# Patient Record
Sex: MALE | Race: WHITE | Employment: OTHER
[De-identification: names, ages, dates, MRNs, and addresses within clinical notes are randomized per-mention and may not be internally consistent; named-entity substitution may affect disease eponyms.]

---

## 2017-09-28 PROBLEM — R31.9 HEMATURIA: Status: ACTIVE | Noted: 2017-09-28

## 2017-11-27 PROBLEM — Z95.2 S/P TAVR (TRANSCATHETER AORTIC VALVE REPLACEMENT): Status: ACTIVE | Noted: 2017-11-27

## 2018-03-06 DIAGNOSIS — Z95.2 S/P TAVR (TRANSCATHETER AORTIC VALVE REPLACEMENT): ICD-10-CM

## 2018-03-06 DIAGNOSIS — I48.91 ATRIAL FIBRILLATION, UNSPECIFIED TYPE (HCC): ICD-10-CM

## 2018-03-06 DIAGNOSIS — Z79.01 CHRONIC ANTICOAGULATION: ICD-10-CM

## 2018-03-06 DIAGNOSIS — Z95.2 S/P MVR (MITRAL VALVE REPLACEMENT): ICD-10-CM

## 2018-03-11 DIAGNOSIS — I34.0 MITRAL VALVE INSUFFICIENCY, UNSPECIFIED ETIOLOGY: ICD-10-CM

## 2018-03-11 DIAGNOSIS — Z79.01 CHRONIC ANTICOAGULATION: ICD-10-CM

## 2018-03-11 DIAGNOSIS — Z95.2 S/P MVR (MITRAL VALVE REPLACEMENT): ICD-10-CM

## 2018-03-11 DIAGNOSIS — Z95.2 S/P TAVR (TRANSCATHETER AORTIC VALVE REPLACEMENT): ICD-10-CM

## 2018-03-11 DIAGNOSIS — I48.91 ATRIAL FIBRILLATION, UNSPECIFIED TYPE (HCC): ICD-10-CM

## 2018-03-12 ENCOUNTER — TELEPHONE (OUTPATIENT)
Dept: CASE MANAGEMENT | Age: 83
End: 2018-03-12

## 2018-03-12 NOTE — LETTER
Program or our CT Lung Cancer Screening Program,  please don't hesitate to call. Sincerely,    Pulmonary Nodule Program  [address]:  [phone]  F:  [phone]                      Medical Imaging Services    3/12/2018      [address] [facility] [address]              Dear Juana Caal,    [address] [place] [address] records indicate that over the past year you had an imaging study/studies done while a patient at a [facility] facility. Because your health is our primary concern, we want to make sure we have the correct primary care physician on file. We currently have Dajuan De La Rosa MD noted as your primary care physician. If this information is not accurate, please call [phone] and provide us with the correct information. If this information is correct, please make sure you have discussed your visit with your physician and understand all results and any follow up recommendations that may or may not be needed now or in the future.            Sincerely,    [address]:  [phone]  F:  [phone]

## 2018-03-12 NOTE — TELEPHONE ENCOUNTER
No call was made, encounter was opened for documentation in the lung nodule navigator flow sheet. Patient has suggested follow up per [facility] for management of incidental pulmonary nodules. Patients with nodules measuring under 0.8cm are not automatically enrolled into incidental pulmonary nodule program.   Notification of nodules letter faxed to Juan Pablo Mills MD 3/12/2018 4:08 PM along with instructions on how to enroll this patient into our incidental pulmonary nodule program if desired. Receipt verified. Letter mailed to patients home stating he should contact his primary care physician to discuss any follow up recommendations.        Angely Sessions, Lung Nodule Navigator

## 2018-03-15 ENCOUNTER — HOSPITAL ENCOUNTER (OUTPATIENT)
Dept: PHARMACY | Age: 83
Setting detail: THERAPIES SERIES
Discharge: HOME | End: 2018-03-15
Payer: MEDICARE

## 2018-03-15 VITALS
BODY MASS INDEX: 30.82 KG/M2 | WEIGHT: 214.8 LBS | DIASTOLIC BLOOD PRESSURE: 81 MMHG | SYSTOLIC BLOOD PRESSURE: 144 MMHG | HEART RATE: 84 BPM

## 2018-03-15 DIAGNOSIS — Z95.2 S/P MVR (MITRAL VALVE REPLACEMENT): ICD-10-CM

## 2018-03-15 DIAGNOSIS — I48.91 ATRIAL FIBRILLATION, UNSPECIFIED TYPE (HCC): ICD-10-CM

## 2018-03-15 LAB — INTERNATIONAL NORMALIZATION RATIO, POC: 3.5

## 2018-03-15 PROCEDURE — 99211 OFF/OP EST MAY X REQ PHY/QHP: CPT

## 2018-03-15 PROCEDURE — 85610 PROTHROMBIN TIME: CPT

## 2018-03-15 RX ORDER — AMLODIPINE BESYLATE 10 MG/1
10 TABLET ORAL DAILY
COMMUNITY

## 2018-03-15 RX ORDER — WARFARIN SODIUM 3 MG/1
3 TABLET ORAL DAILY
COMMUNITY

## 2018-04-12 ENCOUNTER — HOSPITAL ENCOUNTER (OUTPATIENT)
Dept: PHARMACY | Age: 83
Setting detail: THERAPIES SERIES
Discharge: HOME | End: 2018-04-12
Payer: MEDICARE

## 2018-04-12 VITALS
HEART RATE: 73 BPM | BODY MASS INDEX: 31.71 KG/M2 | DIASTOLIC BLOOD PRESSURE: 73 MMHG | WEIGHT: 221 LBS | SYSTOLIC BLOOD PRESSURE: 138 MMHG

## 2018-04-12 DIAGNOSIS — I48.91 ATRIAL FIBRILLATION, UNSPECIFIED TYPE (HCC): ICD-10-CM

## 2018-04-12 DIAGNOSIS — Z95.2 S/P MVR (MITRAL VALVE REPLACEMENT): ICD-10-CM

## 2018-04-12 LAB — INTERNATIONAL NORMALIZATION RATIO, POC: 4.4

## 2018-04-12 PROCEDURE — 99211 OFF/OP EST MAY X REQ PHY/QHP: CPT

## 2018-04-12 PROCEDURE — 85610 PROTHROMBIN TIME: CPT

## 2018-04-20 ENCOUNTER — HOSPITAL ENCOUNTER (OUTPATIENT)
Dept: PHARMACY | Age: 83
Setting detail: THERAPIES SERIES
Discharge: HOME | End: 2018-04-20
Payer: MEDICARE

## 2018-04-20 VITALS
SYSTOLIC BLOOD PRESSURE: 138 MMHG | HEART RATE: 58 BPM | DIASTOLIC BLOOD PRESSURE: 77 MMHG | BODY MASS INDEX: 31.08 KG/M2 | WEIGHT: 216.6 LBS

## 2018-04-20 DIAGNOSIS — I48.91 ATRIAL FIBRILLATION, UNSPECIFIED TYPE (HCC): ICD-10-CM

## 2018-04-20 DIAGNOSIS — Z95.2 S/P MVR (MITRAL VALVE REPLACEMENT): ICD-10-CM

## 2018-04-20 LAB — INTERNATIONAL NORMALIZATION RATIO, POC: 2.9

## 2018-04-20 PROCEDURE — 85610 PROTHROMBIN TIME: CPT

## 2018-04-20 PROCEDURE — 99211 OFF/OP EST MAY X REQ PHY/QHP: CPT

## 2018-05-04 ENCOUNTER — HOSPITAL ENCOUNTER (OUTPATIENT)
Dept: PHARMACY | Age: 83
Setting detail: THERAPIES SERIES
Discharge: HOME | End: 2018-05-04
Payer: MEDICARE

## 2018-05-04 VITALS
WEIGHT: 215 LBS | SYSTOLIC BLOOD PRESSURE: 151 MMHG | DIASTOLIC BLOOD PRESSURE: 82 MMHG | BODY MASS INDEX: 30.85 KG/M2 | HEART RATE: 71 BPM

## 2018-05-04 DIAGNOSIS — Z95.2 S/P MVR (MITRAL VALVE REPLACEMENT): ICD-10-CM

## 2018-05-04 DIAGNOSIS — I48.91 ATRIAL FIBRILLATION, UNSPECIFIED TYPE (HCC): ICD-10-CM

## 2018-05-04 LAB — INTERNATIONAL NORMALIZATION RATIO, POC: 2.6

## 2018-05-04 PROCEDURE — 85610 PROTHROMBIN TIME: CPT

## 2018-05-04 PROCEDURE — 99211 OFF/OP EST MAY X REQ PHY/QHP: CPT

## 2018-05-21 ENCOUNTER — TELEPHONE (OUTPATIENT)
Dept: PHARMACY | Age: 83
End: 2018-05-21

## 2018-05-23 ENCOUNTER — HOSPITAL ENCOUNTER (OUTPATIENT)
Dept: PHARMACY | Age: 83
Setting detail: THERAPIES SERIES
Discharge: HOME | End: 2018-05-23
Payer: MEDICARE

## 2018-05-23 VITALS
DIASTOLIC BLOOD PRESSURE: 66 MMHG | WEIGHT: 212.4 LBS | SYSTOLIC BLOOD PRESSURE: 112 MMHG | BODY MASS INDEX: 30.48 KG/M2 | HEART RATE: 87 BPM

## 2018-05-23 DIAGNOSIS — Z95.2 S/P MVR (MITRAL VALVE REPLACEMENT): ICD-10-CM

## 2018-05-23 DIAGNOSIS — I48.91 ATRIAL FIBRILLATION, UNSPECIFIED TYPE (HCC): ICD-10-CM

## 2018-05-23 LAB — INTERNATIONAL NORMALIZATION RATIO, POC: 3

## 2018-05-23 PROCEDURE — 99211 OFF/OP EST MAY X REQ PHY/QHP: CPT

## 2018-05-23 PROCEDURE — 85610 PROTHROMBIN TIME: CPT

## 2018-05-24 ENCOUNTER — HOSPITAL ENCOUNTER (OUTPATIENT)
Age: 83
Discharge: HOME | End: 2018-05-26

## 2018-05-24 PROCEDURE — 88305 TISSUE EXAM BY PATHOLOGIST: CPT

## 2018-06-05 ENCOUNTER — OFFICE VISIT (OUTPATIENT)
Dept: CARDIOLOGY CLINIC | Age: 83
End: 2018-06-05
Payer: MEDICARE

## 2018-06-05 VITALS
WEIGHT: 215.3 LBS | HEIGHT: 70 IN | SYSTOLIC BLOOD PRESSURE: 134 MMHG | BODY MASS INDEX: 30.82 KG/M2 | HEART RATE: 65 BPM | RESPIRATION RATE: 16 BRPM | DIASTOLIC BLOOD PRESSURE: 60 MMHG

## 2018-06-05 DIAGNOSIS — Z79.01 CHRONIC ANTICOAGULATION: ICD-10-CM

## 2018-06-05 DIAGNOSIS — I48.21 PERMANENT ATRIAL FIBRILLATION (HCC): ICD-10-CM

## 2018-06-05 DIAGNOSIS — Z95.2 S/P TAVR (TRANSCATHETER AORTIC VALVE REPLACEMENT): Primary | ICD-10-CM

## 2018-06-05 DIAGNOSIS — Z95.2 S/P MVR (MITRAL VALVE REPLACEMENT): ICD-10-CM

## 2018-06-05 PROCEDURE — 4040F PNEUMOC VAC/ADMIN/RCVD: CPT | Performed by: INTERNAL MEDICINE

## 2018-06-05 PROCEDURE — 99213 OFFICE O/P EST LOW 20 MIN: CPT | Performed by: INTERNAL MEDICINE

## 2018-06-05 PROCEDURE — 93000 ELECTROCARDIOGRAM COMPLETE: CPT | Performed by: INTERNAL MEDICINE

## 2018-06-05 PROCEDURE — G8417 CALC BMI ABV UP PARAM F/U: HCPCS | Performed by: INTERNAL MEDICINE

## 2018-06-05 PROCEDURE — 1036F TOBACCO NON-USER: CPT | Performed by: INTERNAL MEDICINE

## 2018-06-05 PROCEDURE — 1123F ACP DISCUSS/DSCN MKR DOCD: CPT | Performed by: INTERNAL MEDICINE

## 2018-06-05 PROCEDURE — G8427 DOCREV CUR MEDS BY ELIG CLIN: HCPCS | Performed by: INTERNAL MEDICINE

## 2018-06-05 RX ORDER — WARFARIN SODIUM 4 MG/1
4 TABLET ORAL
COMMUNITY
End: 2019-05-23

## 2018-06-21 ENCOUNTER — HOSPITAL ENCOUNTER (OUTPATIENT)
Dept: PHARMACY | Age: 83
Setting detail: THERAPIES SERIES
Discharge: HOME | End: 2018-06-21
Payer: MEDICARE

## 2018-06-21 VITALS
BODY MASS INDEX: 30.82 KG/M2 | WEIGHT: 214.8 LBS | DIASTOLIC BLOOD PRESSURE: 69 MMHG | SYSTOLIC BLOOD PRESSURE: 119 MMHG | HEART RATE: 75 BPM

## 2018-06-21 DIAGNOSIS — Z95.2 S/P MVR (MITRAL VALVE REPLACEMENT): ICD-10-CM

## 2018-06-21 DIAGNOSIS — I48.21 PERMANENT ATRIAL FIBRILLATION (HCC): ICD-10-CM

## 2018-06-21 LAB — INTERNATIONAL NORMALIZATION RATIO, POC: 3.1

## 2018-06-21 PROCEDURE — 85610 PROTHROMBIN TIME: CPT

## 2018-06-21 PROCEDURE — 99211 OFF/OP EST MAY X REQ PHY/QHP: CPT

## 2018-06-21 RX ORDER — TORSEMIDE 20 MG/1
20 TABLET ORAL DAILY
COMMUNITY

## 2018-07-20 ENCOUNTER — HOSPITAL ENCOUNTER (OUTPATIENT)
Dept: PHARMACY | Age: 83
Setting detail: THERAPIES SERIES
Discharge: HOME | End: 2018-07-20
Payer: MEDICARE

## 2018-07-20 VITALS
HEART RATE: 66 BPM | SYSTOLIC BLOOD PRESSURE: 134 MMHG | BODY MASS INDEX: 31.22 KG/M2 | DIASTOLIC BLOOD PRESSURE: 75 MMHG | WEIGHT: 217.6 LBS

## 2018-07-20 DIAGNOSIS — I48.21 PERMANENT ATRIAL FIBRILLATION (HCC): ICD-10-CM

## 2018-07-20 DIAGNOSIS — Z95.2 S/P MVR (MITRAL VALVE REPLACEMENT): ICD-10-CM

## 2018-07-20 LAB — INTERNATIONAL NORMALIZATION RATIO, POC: 3.3

## 2018-07-20 PROCEDURE — 85610 PROTHROMBIN TIME: CPT

## 2018-07-20 PROCEDURE — 99211 OFF/OP EST MAY X REQ PHY/QHP: CPT

## 2018-07-20 NOTE — PROGRESS NOTES
[facility]    Patient Findings     Negatives:   Signs/symptoms of thrombosis, Signs/symptoms of bleeding, Laboratory test error suspected, Change in health, Change in alcohol use, Change in activity, Upcoming invasive procedure, Emergency department visit, Upcoming dental procedure, Missed doses, Extra doses, Change in medications, Change in diet/appetite, Hospital admission, Bruising, Other complaints    Comments:   DR. Yair Moran CNP NEXT Philip Hanson         Patient  reports that he has quit smoking. He has never used smokeless tobacco.     Assessment/Plan:  INR is therapeutic at 3.3, goal is 2.5-3.5. Warfarin Dose: same (4 mg Mon, Wed, Fri; 3 mg all other days)  Follow Up: 4 weeks    Patient understands dosing directions and information discussed. Dosing schedule and follow up appointment given to patient. Patient acknowledges working in consult agreement with pharmacist as referred by his/her physician.     Tonya Starr PharmD 7/20/2018 9:13 AM

## 2018-08-17 ENCOUNTER — HOSPITAL ENCOUNTER (OUTPATIENT)
Dept: PHARMACY | Age: 83
Setting detail: THERAPIES SERIES
Discharge: HOME | End: 2018-08-17
Payer: MEDICARE

## 2018-08-17 VITALS
BODY MASS INDEX: 31.31 KG/M2 | HEART RATE: 70 BPM | DIASTOLIC BLOOD PRESSURE: 75 MMHG | SYSTOLIC BLOOD PRESSURE: 131 MMHG | WEIGHT: 218.2 LBS

## 2018-08-17 DIAGNOSIS — I48.21 PERMANENT ATRIAL FIBRILLATION (HCC): ICD-10-CM

## 2018-08-17 DIAGNOSIS — Z95.2 S/P MVR (MITRAL VALVE REPLACEMENT): ICD-10-CM

## 2018-08-17 LAB — INTERNATIONAL NORMALIZATION RATIO, POC: 5

## 2018-08-17 PROCEDURE — 85610 PROTHROMBIN TIME: CPT

## 2018-08-17 PROCEDURE — 99211 OFF/OP EST MAY X REQ PHY/QHP: CPT

## 2018-08-17 RX ORDER — OLMESARTAN MEDOXOMIL 40 MG/1
40 TABLET ORAL DAILY
Status: ON HOLD | COMMUNITY
End: 2019-01-01 | Stop reason: HOSPADM

## 2018-08-27 ENCOUNTER — HOSPITAL ENCOUNTER (OUTPATIENT)
Dept: PHARMACY | Age: 83
Setting detail: THERAPIES SERIES
Discharge: HOME | End: 2018-08-27
Payer: MEDICARE

## 2018-08-27 VITALS
WEIGHT: 219 LBS | SYSTOLIC BLOOD PRESSURE: 135 MMHG | BODY MASS INDEX: 31.42 KG/M2 | DIASTOLIC BLOOD PRESSURE: 76 MMHG | HEART RATE: 78 BPM

## 2018-08-27 DIAGNOSIS — Z95.2 S/P MVR (MITRAL VALVE REPLACEMENT): ICD-10-CM

## 2018-08-27 DIAGNOSIS — I48.21 PERMANENT ATRIAL FIBRILLATION (HCC): ICD-10-CM

## 2018-08-27 LAB — INTERNATIONAL NORMALIZATION RATIO, POC: 2.7

## 2018-08-27 PROCEDURE — 85610 PROTHROMBIN TIME: CPT

## 2018-08-27 PROCEDURE — 99211 OFF/OP EST MAY X REQ PHY/QHP: CPT

## 2018-08-27 NOTE — PROGRESS NOTES
[address] [facility]    Patient Findings     Positives:   Change in medications (HE WILL BE ON PREDNISONE FOR AT LEAST 1.5 MORE WEEKS; TO DETERMINE IF IT IS TO BE CONTINUED AFTER LABWORK), Bruising    Negatives:   Signs/symptoms of thrombosis, Signs/symptoms of bleeding, Laboratory test error suspected, Change in health, Change in alcohol use, Change in activity, Upcoming invasive procedure, Emergency department visit, Upcoming dental procedure, Missed doses, Extra doses, Change in diet/appetite, Hospital admission, Other complaints    Comments:   NO APPOINTMENTS Jareth. Patient  reports that he has quit smoking. He has never used smokeless tobacco.     Assessment/Plan:  INR is therapeutic at 2.7, goal is 2.5-3.5. Warfarin Dose: same (4 mg on Wed.; 3 mg all other days) PATIENT TO CALL IF PREDNISONE STOPPED. IF SO, WILL INCREASE WARFARIN DOSE BACK TO PREVIOUS SCHEDULE (4 MG M/W/F; 3 MG ALL OTHER DAYS)  Follow Up: 2 weeks    Patient understands dosing directions and information discussed. Dosing schedule and follow up appointment given to patient. Patient acknowledges working in consult agreement with pharmacist as referred by his/her physician.     Ashutosh Castrejon PharmD 8/27/2018 8:48 AM

## 2018-09-10 ENCOUNTER — HOSPITAL ENCOUNTER (OUTPATIENT)
Dept: PHARMACY | Age: 83
Setting detail: THERAPIES SERIES
Discharge: HOME | End: 2018-09-10
Payer: MEDICARE

## 2018-09-10 VITALS
WEIGHT: 217.6 LBS | DIASTOLIC BLOOD PRESSURE: 80 MMHG | BODY MASS INDEX: 31.22 KG/M2 | SYSTOLIC BLOOD PRESSURE: 147 MMHG | HEART RATE: 82 BPM

## 2018-09-10 DIAGNOSIS — I48.21 PERMANENT ATRIAL FIBRILLATION (HCC): ICD-10-CM

## 2018-09-10 DIAGNOSIS — Z95.2 S/P MVR (MITRAL VALVE REPLACEMENT): ICD-10-CM

## 2018-09-10 DIAGNOSIS — Z95.2 HISTORY OF MITRAL VALVE REPLACEMENT WITH MECHANICAL VALVE: ICD-10-CM

## 2018-09-10 LAB — INTERNATIONAL NORMALIZATION RATIO, POC: 4.4

## 2018-09-10 PROCEDURE — 85610 PROTHROMBIN TIME: CPT

## 2018-09-10 PROCEDURE — 99211 OFF/OP EST MAY X REQ PHY/QHP: CPT

## 2018-09-10 NOTE — PROGRESS NOTES
[address] [facility]    Patient Findings     Positives:   Change in medications (PREDNISONE DECREASED FROM 10 MG TO 8 MG DAILY ON 9/6 (FOR AT LEAST ONE MONTH))    Negatives:   Signs/symptoms of thrombosis, Signs/symptoms of bleeding, Laboratory test error suspected, Change in health, Change in alcohol use, Change in activity, Upcoming invasive procedure, Emergency department visit, Upcoming dental procedure, Missed doses, Extra doses, Change in diet/appetite, Hospital admission, Bruising, Other complaints         Patient  reports that he has quit smoking. He has never used smokeless tobacco.     Assessment/Plan:  INR is SUPRAtherapeutic at 4.4, goal is 2.5-3.5. CAUSE UNKNOWN; WARFARIN DOSE WAS DECREASED 2 VISITS AGO AFTER PREDNISONE STARTED; UNSURE WHY INR ELEVATED TODAY    Warfarin Dose: HOLD TODAY THEN DECREASE WEEKLY DOSE BY 9%     Follow Up: 2 weeks    Patient understands dosing directions and information discussed. Dosing schedule and follow up appointment given to patient. Patient acknowledges working in consult agreement with pharmacist as referred by his/her physician.     Lucien De Jesus PharmD 9/10/2018 12:13 PM

## 2018-09-26 ENCOUNTER — HOSPITAL ENCOUNTER (OUTPATIENT)
Dept: PHARMACY | Age: 83
Setting detail: THERAPIES SERIES
Discharge: HOME | End: 2018-09-26
Payer: MEDICARE

## 2018-09-26 VITALS
SYSTOLIC BLOOD PRESSURE: 126 MMHG | HEART RATE: 80 BPM | BODY MASS INDEX: 31.37 KG/M2 | WEIGHT: 218.6 LBS | DIASTOLIC BLOOD PRESSURE: 70 MMHG

## 2018-09-26 DIAGNOSIS — Z95.2 HISTORY OF MITRAL VALVE REPLACEMENT WITH MECHANICAL VALVE: ICD-10-CM

## 2018-09-26 DIAGNOSIS — I48.91 ATRIAL FIBRILLATION, UNSPECIFIED TYPE (HCC): ICD-10-CM

## 2018-09-26 LAB — INTERNATIONAL NORMALIZATION RATIO, POC: 2.7

## 2018-09-26 PROCEDURE — 99211 OFF/OP EST MAY X REQ PHY/QHP: CPT

## 2018-09-26 PROCEDURE — 85610 PROTHROMBIN TIME: CPT

## 2018-09-26 NOTE — PROGRESS NOTES
[address] [facility]    Patient Findings     Negatives:   Signs/symptoms of thrombosis, Signs/symptoms of bleeding, Laboratory test error suspected, Change in health, Change in alcohol use, Change in activity, Upcoming invasive procedure, Emergency department visit, Upcoming dental procedure, Missed doses, Extra doses, Change in medications, Change in diet/appetite, Hospital admission, Bruising, Other complaints         Patient  reports that he has quit smoking. He has never used smokeless tobacco.     Assessment/Plan:  INR is therapeutic at 2.7, goal is 2.5-3.5. Warfarin Dose: same (2 mg on Wed.; 3 mg all other days)    Follow Up: 2 weeks    Patient understands dosing directions and information discussed. Dosing schedule and follow up appointment given to patient. Patient acknowledges working in consult agreement with pharmacist as referred by his/her physician.     Chema Miranda PharmD 9/26/2018 8:50 AM

## 2018-09-27 ENCOUNTER — HOSPITAL ENCOUNTER (OUTPATIENT)
Dept: NON INVASIVE DIAGNOSTICS | Age: 83
Discharge: HOME | End: 2018-09-27
Payer: MEDICARE

## 2018-09-27 VITALS
DIASTOLIC BLOOD PRESSURE: 69 MMHG | HEIGHT: 70 IN | HEART RATE: 65 BPM | WEIGHT: 218 LBS | BODY MASS INDEX: 31.21 KG/M2 | SYSTOLIC BLOOD PRESSURE: 129 MMHG | TEMPERATURE: 97.9 F

## 2018-09-27 LAB
LV EF: 60 %
LVEF MODALITY: NORMAL

## 2018-09-27 PROCEDURE — 99211 OFF/OP EST MAY X REQ PHY/QHP: CPT

## 2018-09-27 PROCEDURE — 99213 OFFICE O/P EST LOW 20 MIN: CPT | Performed by: PHYSICIAN ASSISTANT

## 2018-09-27 PROCEDURE — 93306 TTE W/DOPPLER COMPLETE: CPT

## 2018-10-11 ENCOUNTER — HOSPITAL ENCOUNTER (OUTPATIENT)
Dept: PHARMACY | Age: 83
Setting detail: THERAPIES SERIES
Discharge: HOME | End: 2018-10-11
Payer: MEDICARE

## 2018-10-11 VITALS — DIASTOLIC BLOOD PRESSURE: 71 MMHG | HEART RATE: 82 BPM | SYSTOLIC BLOOD PRESSURE: 129 MMHG

## 2018-10-11 DIAGNOSIS — Z95.2 HISTORY OF MITRAL VALVE REPLACEMENT WITH MECHANICAL VALVE: ICD-10-CM

## 2018-10-11 DIAGNOSIS — I48.91 ATRIAL FIBRILLATION, UNSPECIFIED TYPE (HCC): ICD-10-CM

## 2018-10-11 LAB — INTERNATIONAL NORMALIZATION RATIO, POC: 2.2

## 2018-10-11 PROCEDURE — 85610 PROTHROMBIN TIME: CPT

## 2018-10-11 PROCEDURE — 99211 OFF/OP EST MAY X REQ PHY/QHP: CPT

## 2018-10-26 ENCOUNTER — HOSPITAL ENCOUNTER (OUTPATIENT)
Dept: PHARMACY | Age: 83
Setting detail: THERAPIES SERIES
Discharge: HOME | End: 2018-10-26
Payer: MEDICARE

## 2018-10-26 VITALS
HEART RATE: 87 BPM | DIASTOLIC BLOOD PRESSURE: 75 MMHG | SYSTOLIC BLOOD PRESSURE: 126 MMHG | WEIGHT: 222.6 LBS | BODY MASS INDEX: 31.94 KG/M2

## 2018-10-26 DIAGNOSIS — I48.91 ATRIAL FIBRILLATION, UNSPECIFIED TYPE (HCC): ICD-10-CM

## 2018-10-26 DIAGNOSIS — Z95.2 HISTORY OF MITRAL VALVE REPLACEMENT WITH MECHANICAL VALVE: ICD-10-CM

## 2018-10-26 LAB — INTERNATIONAL NORMALIZATION RATIO, POC: 3.1

## 2018-10-26 PROCEDURE — 85610 PROTHROMBIN TIME: CPT

## 2018-10-26 PROCEDURE — 99211 OFF/OP EST MAY X REQ PHY/QHP: CPT

## 2018-10-26 NOTE — PROGRESS NOTES
[address] [facility]    Patient Findings     Positives:   Change in medications (PREDNISONE AT 7 MG, TO DECREASE TO 6 MG DAILY NEXT WEEK)    Negatives:   Signs/symptoms of thrombosis, Signs/symptoms of bleeding, Laboratory test error suspected, Change in health, Change in alcohol use, Change in activity, Upcoming invasive procedure, Emergency department visit, Upcoming dental procedure, Missed doses, Extra doses, Change in diet/appetite, Hospital admission, Bruising, Other complaints    Comments:   Dr. Mary Ellen Caldera CNP in Nov.         Patient  reports that he has quit smoking. He has never used smokeless tobacco.     Assessment/Plan:  Warfarin indication: mechanical MVR; atrial fibrillation      INR today is therapeutic at 3.1, goal is 2.5-3.5. Warfarin Dose: same (4 mg on Thur.; 3 mg all other days)    Follow Up: 2.5-3 weeks    Patient understands dosing directions and information discussed. Dosing schedule and follow up appointment given to patient. Patient acknowledges working in consult agreement with pharmacist as referred by his/her physician.     Arun Obregon PharmD 10/26/2018 8:48 AM

## 2018-11-16 ENCOUNTER — HOSPITAL ENCOUNTER (OUTPATIENT)
Dept: PHARMACY | Age: 83
Setting detail: THERAPIES SERIES
Discharge: HOME | End: 2018-11-16
Payer: MEDICARE

## 2018-11-16 VITALS
HEART RATE: 85 BPM | WEIGHT: 219 LBS | DIASTOLIC BLOOD PRESSURE: 88 MMHG | SYSTOLIC BLOOD PRESSURE: 158 MMHG | BODY MASS INDEX: 31.42 KG/M2

## 2018-11-16 DIAGNOSIS — Z95.2 HISTORY OF MITRAL VALVE REPLACEMENT WITH MECHANICAL VALVE: ICD-10-CM

## 2018-11-16 DIAGNOSIS — I48.91 ATRIAL FIBRILLATION, UNSPECIFIED TYPE (HCC): ICD-10-CM

## 2018-11-16 LAB — INTERNATIONAL NORMALIZATION RATIO, POC: 3.3

## 2018-11-16 PROCEDURE — 85610 PROTHROMBIN TIME: CPT

## 2018-11-16 PROCEDURE — 99211 OFF/OP EST MAY X REQ PHY/QHP: CPT

## 2018-11-30 NOTE — PROGRESS NOTES
[facility]  Visit Note      Patient name: Mercedes Childs    Reason for visit: TAVR follow up    Referring Physician: Jordan Casillas MD    Primary Care Physician: Ami Blanco MD    Date of service: 9/27/2018      Chief Complaint: TAVR follow up     HPI: Mr. Doug Otero presents for follow up s/p TAVR on 9/27/17. He continues to do very well. He is able to perform all desired activities without dyspnea. He denies chest pain, orthopnea, PND, palpitations or syncope. He continues with mild LE edema, but is wearing MICHELLE hose. His arthritis pain has improved as well. Allergies: No Known Allergies    Home medications:    Current Outpatient Prescriptions   Medication Sig Dispense Refill    predniSONE (DELTASONE) 5 MG tablet Take 7 mg by mouth See Admin Instructions TAKING TWO 1MG TABS + 5 MG TO MAKE 8 MG (ONCE DAILY)      olmesartan (BENICAR) 40 MG tablet Take 40 mg by mouth daily      magnesium hydroxide (MILK OF MAGNESIA) 400 MG/5ML suspension Take by mouth daily as needed for Constipation      torsemide (DEMADEX) 20 MG tablet Take 20 mg by mouth daily      warfarin (COUMADIN) 4 MG tablet Take 4 mg by mouth three times a week mon, wed and fri, 4 mg      warfarin (COUMADIN) 3 MG tablet Take 3 mg by mouth daily Take as directed by [place] [address] [facility] (Medication Management)   MENDEZ Coumadin      amLODIPine (NORVASC) 10 MG tablet Take 10 mg by mouth daily      HYDROcodone-acetaminophen (NORCO)  MG per tablet TK 1 T PO QID PRN  0    metoprolol (TOPROL-XL) 50 MG XL tablet Take 25 mg by mouth 2 times daily Take morning of surgery with a sip of water      Multiple Vitamins-Minerals (CERTAGEN SILVER PO) Take  by mouth daily. No current facility-administered medications for this encounter.         Past Medical History:  Past Medical History:   Diagnosis Date    Arrhythmia atrial fibrillation    [facility]     skin nose    Difficult intubation 04/11/2007    tube size 7.5    normal effort. He is not in respiratory distress. Breath sounds clear to auscultation. No wheezes. Heart: Normal rate. Regular rhythm. S1 normal and S2 normal. I/VI systolic murmur. Chest: Symmetric chest wall expansion. Extremities: Normal range of motion. Trace-mild edema bilaterally. Neurological: Patient is alert and oriented to person, place and time. Patient has normal reflexes. Skin: Warm and dry. Abdomen: Abdomen is soft and non-distended. Bowel sounds are normal. There is no abdominal tenderness tenderness. There is no guarding. There is no mass. Pulses: Distal pulses are intact. Skin: Warm and dry without lesions. Assessment:   1. Severe AS s/p TAVR - doing well. Mean gradient 10 mmHg by echo today  2. Mechanical MVR  3. Permanent AFib - chronic warfarin managed at [facility]  4. NYHA class I      Plan:   1. Follow up for one year echo as scheduled  2. SBE prophylaxis  3. Follow up with Esperanza Gill and Reinaldo Shen as scheduled      Electronically signed by Mis Jeffries PA-C on 11/30/2018 at 10:58 AM

## 2018-12-06 PROBLEM — I48.21 PERMANENT ATRIAL FIBRILLATION (HCC): Status: ACTIVE | Noted: 2018-12-06

## 2018-12-07 ENCOUNTER — OFFICE VISIT (OUTPATIENT)
Dept: CARDIOLOGY CLINIC | Age: 83
End: 2018-12-07
Payer: MEDICARE

## 2018-12-07 VITALS
SYSTOLIC BLOOD PRESSURE: 120 MMHG | HEIGHT: 70 IN | BODY MASS INDEX: 30.71 KG/M2 | RESPIRATION RATE: 18 BRPM | WEIGHT: 214.5 LBS | DIASTOLIC BLOOD PRESSURE: 74 MMHG | HEART RATE: 69 BPM

## 2018-12-07 DIAGNOSIS — Z95.2 S/P TAVR (TRANSCATHETER AORTIC VALVE REPLACEMENT): ICD-10-CM

## 2018-12-07 DIAGNOSIS — Z79.01 CHRONIC ANTICOAGULATION: ICD-10-CM

## 2018-12-07 DIAGNOSIS — Z95.2 S/P MVR (MITRAL VALVE REPLACEMENT): Primary | ICD-10-CM

## 2018-12-07 DIAGNOSIS — I48.21 PERMANENT ATRIAL FIBRILLATION (HCC): ICD-10-CM

## 2018-12-07 PROCEDURE — 1101F PT FALLS ASSESS-DOCD LE1/YR: CPT | Performed by: INTERNAL MEDICINE

## 2018-12-07 PROCEDURE — G8417 CALC BMI ABV UP PARAM F/U: HCPCS | Performed by: INTERNAL MEDICINE

## 2018-12-07 PROCEDURE — G8484 FLU IMMUNIZE NO ADMIN: HCPCS | Performed by: INTERNAL MEDICINE

## 2018-12-07 PROCEDURE — 99214 OFFICE O/P EST MOD 30 MIN: CPT | Performed by: INTERNAL MEDICINE

## 2018-12-07 PROCEDURE — 1036F TOBACCO NON-USER: CPT | Performed by: INTERNAL MEDICINE

## 2018-12-07 PROCEDURE — 93000 ELECTROCARDIOGRAM COMPLETE: CPT | Performed by: INTERNAL MEDICINE

## 2018-12-07 PROCEDURE — 1123F ACP DISCUSS/DSCN MKR DOCD: CPT | Performed by: INTERNAL MEDICINE

## 2018-12-07 PROCEDURE — G8427 DOCREV CUR MEDS BY ELIG CLIN: HCPCS | Performed by: INTERNAL MEDICINE

## 2018-12-07 PROCEDURE — 4040F PNEUMOC VAC/ADMIN/RCVD: CPT | Performed by: INTERNAL MEDICINE

## 2018-12-07 RX ORDER — LOPERAMIDE HYDROCHLORIDE 2 MG/1
2 CAPSULE ORAL 4 TIMES DAILY PRN
COMMUNITY
End: 2019-06-17 | Stop reason: CLARIF

## 2018-12-07 RX ORDER — MAGNESIUM HYDROXIDE/ALUMINUM HYDROXICE/SIMETHICONE 120; 1200; 1200 MG/30ML; MG/30ML; MG/30ML
5 SUSPENSION ORAL EVERY 6 HOURS PRN
Status: ON HOLD | COMMUNITY
End: 2019-01-01 | Stop reason: HOSPADM

## 2018-12-07 RX ORDER — ACETAMINOPHEN 325 MG/1
650 TABLET ORAL EVERY 6 HOURS PRN
COMMUNITY
End: 2019-06-17 | Stop reason: CLARIF

## 2018-12-07 RX ORDER — GUAIFENESIN AND DEXTROMETHORPHAN HYDROBROMIDE 100; 10 MG/5ML; MG/5ML
5 SOLUTION ORAL EVERY 4 HOURS PRN
Status: ON HOLD | COMMUNITY
End: 2019-01-01 | Stop reason: HOSPADM

## 2018-12-07 RX ORDER — BISACODYL 10 MG
10 SUPPOSITORY, RECTAL RECTAL DAILY
Status: ON HOLD | COMMUNITY
End: 2019-01-01 | Stop reason: HOSPADM

## 2018-12-07 RX ORDER — LORATADINE 10 MG/1
10 CAPSULE, LIQUID FILLED ORAL DAILY
COMMUNITY
End: 2019-06-17 | Stop reason: CLARIF

## 2018-12-14 ENCOUNTER — HOSPITAL ENCOUNTER (OUTPATIENT)
Dept: PHARMACY | Age: 83
Setting detail: THERAPIES SERIES
Discharge: HOME | End: 2018-12-14
Payer: MEDICARE

## 2018-12-14 VITALS
SYSTOLIC BLOOD PRESSURE: 126 MMHG | WEIGHT: 216 LBS | HEART RATE: 79 BPM | DIASTOLIC BLOOD PRESSURE: 75 MMHG | BODY MASS INDEX: 30.99 KG/M2

## 2018-12-14 DIAGNOSIS — Z95.2 HISTORY OF MITRAL VALVE REPLACEMENT WITH MECHANICAL VALVE: ICD-10-CM

## 2018-12-14 LAB — INTERNATIONAL NORMALIZATION RATIO, POC: 2.9

## 2018-12-14 PROCEDURE — 85610 PROTHROMBIN TIME: CPT

## 2018-12-14 PROCEDURE — 99211 OFF/OP EST MAY X REQ PHY/QHP: CPT

## 2019-01-01 ENCOUNTER — OFFICE VISIT (OUTPATIENT)
Dept: PALLATIVE CARE | Age: 84
End: 2019-01-01
Payer: MEDICARE

## 2019-01-01 ENCOUNTER — TELEPHONE (OUTPATIENT)
Dept: PHARMACY | Age: 84
End: 2019-01-01

## 2019-01-01 ENCOUNTER — APPOINTMENT (OUTPATIENT)
Dept: GENERAL RADIOLOGY | Age: 84
End: 2019-01-01
Payer: MEDICARE

## 2019-01-01 ENCOUNTER — APPOINTMENT (OUTPATIENT)
Dept: GENERAL RADIOLOGY | Age: 84
DRG: 178 | End: 2019-01-01
Payer: MEDICARE

## 2019-01-01 ENCOUNTER — HOSPITAL ENCOUNTER (OUTPATIENT)
Dept: PHARMACY | Age: 84
Setting detail: THERAPIES SERIES
Discharge: HOME | End: 2019-08-01
Payer: MEDICARE

## 2019-01-01 ENCOUNTER — HOSPITAL ENCOUNTER (OUTPATIENT)
Dept: PHARMACY | Age: 84
Setting detail: THERAPIES SERIES
Discharge: HOME | End: 2019-08-30
Payer: MEDICARE

## 2019-01-01 ENCOUNTER — CLINICAL DOCUMENTATION (OUTPATIENT)
Dept: PALLATIVE CARE | Age: 84
End: 2019-01-01

## 2019-01-01 ENCOUNTER — CARE COORDINATION (OUTPATIENT)
Dept: CASE MANAGEMENT | Age: 84
End: 2019-01-01

## 2019-01-01 ENCOUNTER — OFFICE VISIT (OUTPATIENT)
Dept: CARDIOLOGY CLINIC | Age: 84
End: 2019-01-01
Payer: MEDICARE

## 2019-01-01 ENCOUNTER — TELEPHONE (OUTPATIENT)
Dept: PALLATIVE CARE | Age: 84
End: 2019-01-01

## 2019-01-01 ENCOUNTER — APPOINTMENT (OUTPATIENT)
Dept: CT IMAGING | Age: 84
DRG: 178 | End: 2019-01-01
Payer: MEDICARE

## 2019-01-01 ENCOUNTER — HOSPITAL ENCOUNTER (INPATIENT)
Age: 84
LOS: 8 days | Discharge: HOSPICE | DRG: 178 | End: 2019-08-27
Attending: EMERGENCY MEDICINE | Admitting: FAMILY MEDICINE
Payer: MEDICARE

## 2019-01-01 ENCOUNTER — HOSPITAL ENCOUNTER (EMERGENCY)
Age: 84
Discharge: HOME | End: 2019-08-16
Attending: EMERGENCY MEDICINE
Payer: MEDICARE

## 2019-01-01 ENCOUNTER — HOSPITAL ENCOUNTER (OUTPATIENT)
Dept: PHARMACY | Age: 84
Setting detail: THERAPIES SERIES
Discharge: HOME | End: 2019-07-19
Payer: MEDICARE

## 2019-01-01 ENCOUNTER — HOSPITAL ENCOUNTER (EMERGENCY)
Age: 84
Discharge: HOME | End: 2019-08-17
Attending: EMERGENCY MEDICINE
Payer: MEDICARE

## 2019-01-01 ENCOUNTER — HOSPITAL ENCOUNTER (EMERGENCY)
Age: 84
Discharge: HOME | End: 2019-08-18
Attending: EMERGENCY MEDICINE
Payer: MEDICARE

## 2019-01-01 VITALS
SYSTOLIC BLOOD PRESSURE: 112 MMHG | HEART RATE: 78 BPM | RESPIRATION RATE: 18 BRPM | WEIGHT: 203 LBS | DIASTOLIC BLOOD PRESSURE: 70 MMHG | BODY MASS INDEX: 29.06 KG/M2 | HEIGHT: 70 IN

## 2019-01-01 VITALS
OXYGEN SATURATION: 99 % | DIASTOLIC BLOOD PRESSURE: 66 MMHG | WEIGHT: 215 LBS | RESPIRATION RATE: 18 BRPM | BODY MASS INDEX: 30.78 KG/M2 | TEMPERATURE: 97.9 F | HEIGHT: 70 IN | HEART RATE: 89 BPM | SYSTOLIC BLOOD PRESSURE: 111 MMHG

## 2019-01-01 VITALS
DIASTOLIC BLOOD PRESSURE: 71 MMHG | SYSTOLIC BLOOD PRESSURE: 153 MMHG | HEART RATE: 74 BPM | WEIGHT: 215.8 LBS | BODY MASS INDEX: 30.96 KG/M2

## 2019-01-01 VITALS
TEMPERATURE: 98 F | HEART RATE: 69 BPM | HEIGHT: 70 IN | WEIGHT: 215 LBS | SYSTOLIC BLOOD PRESSURE: 140 MMHG | RESPIRATION RATE: 16 BRPM | DIASTOLIC BLOOD PRESSURE: 75 MMHG | BODY MASS INDEX: 30.78 KG/M2 | OXYGEN SATURATION: 96 %

## 2019-01-01 VITALS
HEART RATE: 73 BPM | WEIGHT: 210 LBS | DIASTOLIC BLOOD PRESSURE: 58 MMHG | SYSTOLIC BLOOD PRESSURE: 99 MMHG | BODY MASS INDEX: 30.13 KG/M2

## 2019-01-01 VITALS
RESPIRATION RATE: 14 BRPM | HEART RATE: 66 BPM | OXYGEN SATURATION: 95 % | DIASTOLIC BLOOD PRESSURE: 68 MMHG | SYSTOLIC BLOOD PRESSURE: 132 MMHG

## 2019-01-01 VITALS
OXYGEN SATURATION: 98 % | DIASTOLIC BLOOD PRESSURE: 68 MMHG | HEIGHT: 70 IN | HEART RATE: 79 BPM | WEIGHT: 213.2 LBS | TEMPERATURE: 98.3 F | BODY MASS INDEX: 30.52 KG/M2 | RESPIRATION RATE: 18 BRPM | SYSTOLIC BLOOD PRESSURE: 146 MMHG

## 2019-01-01 VITALS
RESPIRATION RATE: 14 BRPM | OXYGEN SATURATION: 97 % | HEART RATE: 78 BPM | SYSTOLIC BLOOD PRESSURE: 142 MMHG | DIASTOLIC BLOOD PRESSURE: 68 MMHG

## 2019-01-01 VITALS
WEIGHT: 215 LBS | OXYGEN SATURATION: 98 % | TEMPERATURE: 97.8 F | BODY MASS INDEX: 30.78 KG/M2 | RESPIRATION RATE: 18 BRPM | HEIGHT: 70 IN | DIASTOLIC BLOOD PRESSURE: 81 MMHG | SYSTOLIC BLOOD PRESSURE: 147 MMHG | HEART RATE: 63 BPM

## 2019-01-01 VITALS
DIASTOLIC BLOOD PRESSURE: 77 MMHG | WEIGHT: 216.8 LBS | SYSTOLIC BLOOD PRESSURE: 143 MMHG | HEART RATE: 71 BPM | BODY MASS INDEX: 31.11 KG/M2

## 2019-01-01 DIAGNOSIS — Z79.01 CHRONIC ANTICOAGULATION: ICD-10-CM

## 2019-01-01 DIAGNOSIS — I48.91 ATRIAL FIBRILLATION, UNSPECIFIED TYPE (HCC): ICD-10-CM

## 2019-01-01 DIAGNOSIS — I48.21 PERMANENT ATRIAL FIBRILLATION (HCC): ICD-10-CM

## 2019-01-01 DIAGNOSIS — Z95.2 S/P MVR (MITRAL VALVE REPLACEMENT): Primary | ICD-10-CM

## 2019-01-01 DIAGNOSIS — Z95.2 HISTORY OF MITRAL VALVE REPLACEMENT WITH MECHANICAL VALVE: ICD-10-CM

## 2019-01-01 DIAGNOSIS — Z51.5 PALLIATIVE CARE BY SPECIALIST: Primary | ICD-10-CM

## 2019-01-01 DIAGNOSIS — Z95.2 S/P TAVR (TRANSCATHETER AORTIC VALVE REPLACEMENT): ICD-10-CM

## 2019-01-01 DIAGNOSIS — K06.8 BLEEDING GUMS: Primary | ICD-10-CM

## 2019-01-01 DIAGNOSIS — Z79.01 ANTICOAGULATED ON COUMADIN: ICD-10-CM

## 2019-01-01 DIAGNOSIS — R54 AGE-RELATED PHYSICAL DEBILITY: ICD-10-CM

## 2019-01-01 LAB
ABO/RH: NORMAL
ALBUMIN SERPL-MCNC: 3.6 G/DL (ref 3.5–5.2)
ALBUMIN SERPL-MCNC: 3.7 G/DL (ref 3.5–5.2)
ALBUMIN SERPL-MCNC: 3.8 G/DL (ref 3.5–5.2)
ALP BLD-CCNC: 54 U/L (ref 40–129)
ALP BLD-CCNC: 54 U/L (ref 40–129)
ALP BLD-CCNC: 58 U/L (ref 40–129)
ALT SERPL-CCNC: 11 U/L (ref 0–40)
ALT SERPL-CCNC: 13 U/L (ref 0–40)
ALT SERPL-CCNC: 16 U/L (ref 0–40)
ANION GAP SERPL CALCULATED.3IONS-SCNC: 10 MMOL/L (ref 7–16)
ANION GAP SERPL CALCULATED.3IONS-SCNC: 12 MMOL/L (ref 7–16)
ANION GAP SERPL CALCULATED.3IONS-SCNC: 13 MMOL/L (ref 7–16)
ANION GAP SERPL CALCULATED.3IONS-SCNC: 13 MMOL/L (ref 7–16)
ANION GAP SERPL CALCULATED.3IONS-SCNC: 8 MMOL/L (ref 7–16)
ANION GAP SERPL CALCULATED.3IONS-SCNC: 8 MMOL/L (ref 7–16)
ANION GAP SERPL CALCULATED.3IONS-SCNC: 9 MMOL/L (ref 7–16)
ANION GAP SERPL CALCULATED.3IONS-SCNC: 9 MMOL/L (ref 7–16)
ANTIBODY SCREEN: NORMAL
APTT: 43.3 SEC (ref 24.5–35.1)
APTT: 44.4 SEC (ref 24.5–35.1)
AST SERPL-CCNC: 23 U/L (ref 0–39)
AST SERPL-CCNC: 25 U/L (ref 0–39)
AST SERPL-CCNC: 28 U/L (ref 0–39)
B.E.: -1.7 MMOL/L (ref -3–3)
BASOPHILS ABSOLUTE: 0.01 E9/L (ref 0–0.2)
BASOPHILS ABSOLUTE: 0.02 E9/L (ref 0–0.2)
BASOPHILS ABSOLUTE: 0.03 E9/L (ref 0–0.2)
BASOPHILS ABSOLUTE: 0.04 E9/L (ref 0–0.2)
BASOPHILS ABSOLUTE: 0.04 E9/L (ref 0–0.2)
BASOPHILS RELATIVE PERCENT: 0.1 % (ref 0–2)
BASOPHILS RELATIVE PERCENT: 0.1 % (ref 0–2)
BASOPHILS RELATIVE PERCENT: 0.4 % (ref 0–2)
BASOPHILS RELATIVE PERCENT: 0.6 % (ref 0–2)
BASOPHILS RELATIVE PERCENT: 0.6 % (ref 0–2)
BILIRUB SERPL-MCNC: 0.6 MG/DL (ref 0–1.2)
BILIRUB SERPL-MCNC: 0.6 MG/DL (ref 0–1.2)
BILIRUB SERPL-MCNC: 0.9 MG/DL (ref 0–1.2)
BLOOD BANK DISPENSE STATUS: NORMAL
BLOOD BANK DISPENSE STATUS: NORMAL
BLOOD BANK PRODUCT CODE: NORMAL
BLOOD BANK PRODUCT CODE: NORMAL
BLOOD CULTURE, ROUTINE: ABNORMAL
BPU ID: NORMAL
BPU ID: NORMAL
BUN BLDV-MCNC: 23 MG/DL (ref 8–23)
BUN BLDV-MCNC: 23 MG/DL (ref 8–23)
BUN BLDV-MCNC: 25 MG/DL (ref 8–23)
BUN BLDV-MCNC: 28 MG/DL (ref 8–23)
BUN BLDV-MCNC: 29 MG/DL (ref 8–23)
BUN BLDV-MCNC: 32 MG/DL (ref 8–23)
BUN BLDV-MCNC: 37 MG/DL (ref 8–23)
BUN BLDV-MCNC: 51 MG/DL (ref 8–23)
BUN BLDV-MCNC: 59 MG/DL (ref 8–23)
BUN BLDV-MCNC: 60 MG/DL (ref 8–23)
CALCIUM SERPL-MCNC: 8.2 MG/DL (ref 8.6–10.2)
CALCIUM SERPL-MCNC: 8.3 MG/DL (ref 8.6–10.2)
CALCIUM SERPL-MCNC: 8.4 MG/DL (ref 8.6–10.2)
CALCIUM SERPL-MCNC: 8.5 MG/DL (ref 8.6–10.2)
CALCIUM SERPL-MCNC: 8.5 MG/DL (ref 8.6–10.2)
CALCIUM SERPL-MCNC: 8.6 MG/DL (ref 8.6–10.2)
CALCIUM SERPL-MCNC: 9 MG/DL (ref 8.6–10.2)
CALCIUM SERPL-MCNC: 9 MG/DL (ref 8.6–10.2)
CALCIUM SERPL-MCNC: 9.1 MG/DL (ref 8.6–10.2)
CALCIUM SERPL-MCNC: 9.2 MG/DL (ref 8.6–10.2)
CHLORIDE BLD-SCNC: 100 MMOL/L (ref 98–107)
CHLORIDE BLD-SCNC: 102 MMOL/L (ref 98–107)
CHLORIDE BLD-SCNC: 103 MMOL/L (ref 98–107)
CHLORIDE BLD-SCNC: 105 MMOL/L (ref 98–107)
CHLORIDE BLD-SCNC: 99 MMOL/L (ref 98–107)
CO2: 23 MMOL/L (ref 22–29)
CO2: 25 MMOL/L (ref 22–29)
CO2: 26 MMOL/L (ref 22–29)
CO2: 26 MMOL/L (ref 22–29)
CO2: 27 MMOL/L (ref 22–29)
CO2: 28 MMOL/L (ref 22–29)
COHB: 1.2 % (ref 0–1.5)
CREAT SERPL-MCNC: 0.9 MG/DL (ref 0.7–1.2)
CREAT SERPL-MCNC: 1 MG/DL (ref 0.7–1.2)
CREAT SERPL-MCNC: 1.1 MG/DL (ref 0.7–1.2)
CREAT SERPL-MCNC: 1.3 MG/DL (ref 0.7–1.2)
CREAT SERPL-MCNC: 1.5 MG/DL (ref 0.7–1.2)
CREAT SERPL-MCNC: 1.6 MG/DL (ref 0.7–1.2)
CREAT SERPL-MCNC: 1.6 MG/DL (ref 0.7–1.2)
CREAT SERPL-MCNC: 1.8 MG/DL (ref 0.7–1.2)
CRITICAL: ABNORMAL
CULTURE, BLOOD 2: NORMAL
DATE ANALYZED: ABNORMAL
DATE OF COLLECTION: ABNORMAL
DESCRIPTION BLOOD BANK: NORMAL
DESCRIPTION BLOOD BANK: NORMAL
DOHLE BODIES: ABNORMAL
EKG ATRIAL RATE: 61 BPM
EKG Q-T INTERVAL: 412 MS
EKG QRS DURATION: 112 MS
EKG QTC CALCULATION (BAZETT): 481 MS
EKG R AXIS: 63 DEGREES
EKG T AXIS: 67 DEGREES
EKG VENTRICULAR RATE: 82 BPM
EOSINOPHILS ABSOLUTE: 0 E9/L (ref 0.05–0.5)
EOSINOPHILS ABSOLUTE: 0.02 E9/L (ref 0.05–0.5)
EOSINOPHILS ABSOLUTE: 0.03 E9/L (ref 0.05–0.5)
EOSINOPHILS ABSOLUTE: 0.08 E9/L (ref 0.05–0.5)
EOSINOPHILS ABSOLUTE: 0.09 E9/L (ref 0.05–0.5)
EOSINOPHILS RELATIVE PERCENT: 0 % (ref 0–6)
EOSINOPHILS RELATIVE PERCENT: 0.2 % (ref 0–6)
EOSINOPHILS RELATIVE PERCENT: 0.4 % (ref 0–6)
EOSINOPHILS RELATIVE PERCENT: 1.2 % (ref 0–6)
EOSINOPHILS RELATIVE PERCENT: 1.4 % (ref 0–6)
GFR AFRICAN AMERICAN: 43
GFR AFRICAN AMERICAN: 49
GFR AFRICAN AMERICAN: 49
GFR AFRICAN AMERICAN: 53
GFR AFRICAN AMERICAN: >60
GFR NON-AFRICAN AMERICAN: 35 ML/MIN/1.73
GFR NON-AFRICAN AMERICAN: 41 ML/MIN/1.73
GFR NON-AFRICAN AMERICAN: 41 ML/MIN/1.73
GFR NON-AFRICAN AMERICAN: 44 ML/MIN/1.73
GFR NON-AFRICAN AMERICAN: 52 ML/MIN/1.73
GFR NON-AFRICAN AMERICAN: >60 ML/MIN/1.73
GLUCOSE BLD-MCNC: 103 MG/DL (ref 74–99)
GLUCOSE BLD-MCNC: 104 MG/DL (ref 74–99)
GLUCOSE BLD-MCNC: 109 MG/DL (ref 74–99)
GLUCOSE BLD-MCNC: 111 MG/DL (ref 74–99)
GLUCOSE BLD-MCNC: 116 MG/DL (ref 74–99)
GLUCOSE BLD-MCNC: 121 MG/DL (ref 74–99)
GLUCOSE BLD-MCNC: 151 MG/DL (ref 74–99)
GLUCOSE BLD-MCNC: 90 MG/DL (ref 74–99)
GLUCOSE BLD-MCNC: 92 MG/DL (ref 74–99)
GLUCOSE BLD-MCNC: 97 MG/DL (ref 74–99)
HCO3: 20.8 MMOL/L (ref 22–26)
HCT VFR BLD CALC: 24.7 % (ref 37–54)
HCT VFR BLD CALC: 28.8 % (ref 37–54)
HCT VFR BLD CALC: 28.9 % (ref 37–54)
HCT VFR BLD CALC: 30.8 % (ref 37–54)
HCT VFR BLD CALC: 31.8 % (ref 37–54)
HCT VFR BLD CALC: 32.6 % (ref 37–54)
HCT VFR BLD CALC: 32.9 % (ref 37–54)
HCT VFR BLD CALC: 32.9 % (ref 37–54)
HCT VFR BLD CALC: 36 % (ref 37–54)
HCT VFR BLD CALC: 37.2 % (ref 37–54)
HCT VFR BLD CALC: 38.9 % (ref 37–54)
HEMOGLOBIN: 10.2 G/DL (ref 12.5–16.5)
HEMOGLOBIN: 10.3 G/DL (ref 12.5–16.5)
HEMOGLOBIN: 10.6 G/DL (ref 12.5–16.5)
HEMOGLOBIN: 10.9 G/DL (ref 12.5–16.5)
HEMOGLOBIN: 11.3 G/DL (ref 12.5–16.5)
HEMOGLOBIN: 11.8 G/DL (ref 12.5–16.5)
HEMOGLOBIN: 12.2 G/DL (ref 12.5–16.5)
HEMOGLOBIN: 8.1 G/DL (ref 12.5–16.5)
HEMOGLOBIN: 9.2 G/DL (ref 12.5–16.5)
HEMOGLOBIN: 9.2 G/DL (ref 12.5–16.5)
HEMOGLOBIN: 9.8 G/DL (ref 12.5–16.5)
HHB: 4.4 % (ref 0–5)
IMMATURE GRANULOCYTES #: 0.02 E9/L
IMMATURE GRANULOCYTES #: 0.04 E9/L
IMMATURE GRANULOCYTES #: 0.04 E9/L
IMMATURE GRANULOCYTES #: 0.06 E9/L
IMMATURE GRANULOCYTES #: 0.07 E9/L
IMMATURE GRANULOCYTES %: 0.3 % (ref 0–5)
IMMATURE GRANULOCYTES %: 0.4 % (ref 0–5)
IMMATURE GRANULOCYTES %: 0.5 % (ref 0–5)
IMMATURE GRANULOCYTES %: 0.6 % (ref 0–5)
IMMATURE GRANULOCYTES %: 0.7 % (ref 0–5)
INR BLD: 1.3
INR BLD: 1.4
INR BLD: 1.6
INR BLD: 1.7
INR BLD: 2.7
INR BLD: 2.7
INR BLD: 2.8
INR BLD: 3
INR BLD: 3.5
INR BLD: 4.3
INR BLD: 5.2
INR BLD: 5.7
INTERNATIONAL NORMALIZATION RATIO, POC: 2.3
INTERNATIONAL NORMALIZATION RATIO, POC: 2.9
INTERNATIONAL NORMALIZATION RATIO, POC: 3.4
LAB: ABNORMAL
LYMPHOCYTES ABSOLUTE: 0.29 E9/L (ref 1.5–4)
LYMPHOCYTES ABSOLUTE: 0.45 E9/L (ref 1.5–4)
LYMPHOCYTES ABSOLUTE: 0.58 E9/L (ref 1.5–4)
LYMPHOCYTES ABSOLUTE: 0.61 E9/L (ref 1.5–4)
LYMPHOCYTES ABSOLUTE: 0.77 E9/L (ref 1.5–4)
LYMPHOCYTES RELATIVE PERCENT: 1.7 % (ref 20–42)
LYMPHOCYTES RELATIVE PERCENT: 11.6 % (ref 20–42)
LYMPHOCYTES RELATIVE PERCENT: 4.9 % (ref 20–42)
LYMPHOCYTES RELATIVE PERCENT: 7.6 % (ref 20–42)
LYMPHOCYTES RELATIVE PERCENT: 9.1 % (ref 20–42)
Lab: ABNORMAL
MCH RBC QN AUTO: 29.7 PG (ref 26–35)
MCH RBC QN AUTO: 29.8 PG (ref 26–35)
MCH RBC QN AUTO: 29.9 PG (ref 26–35)
MCH RBC QN AUTO: 30 PG (ref 26–35)
MCH RBC QN AUTO: 30.1 PG (ref 26–35)
MCH RBC QN AUTO: 30.2 PG (ref 26–35)
MCH RBC QN AUTO: 30.2 PG (ref 26–35)
MCH RBC QN AUTO: 30.3 PG (ref 26–35)
MCH RBC QN AUTO: 30.7 PG (ref 26–35)
MCH RBC QN AUTO: 30.7 PG (ref 26–35)
MCH RBC QN AUTO: 31 PG (ref 26–35)
MCHC RBC AUTO-ENTMCNC: 31.3 % (ref 32–34.5)
MCHC RBC AUTO-ENTMCNC: 31.4 % (ref 32–34.5)
MCHC RBC AUTO-ENTMCNC: 31.4 % (ref 32–34.5)
MCHC RBC AUTO-ENTMCNC: 31.7 % (ref 32–34.5)
MCHC RBC AUTO-ENTMCNC: 31.8 % (ref 32–34.5)
MCHC RBC AUTO-ENTMCNC: 31.8 % (ref 32–34.5)
MCHC RBC AUTO-ENTMCNC: 31.9 % (ref 32–34.5)
MCHC RBC AUTO-ENTMCNC: 32.1 % (ref 32–34.5)
MCHC RBC AUTO-ENTMCNC: 32.5 % (ref 32–34.5)
MCHC RBC AUTO-ENTMCNC: 32.8 % (ref 32–34.5)
MCHC RBC AUTO-ENTMCNC: 33.1 % (ref 32–34.5)
MCV RBC AUTO: 93 FL (ref 80–99.9)
MCV RBC AUTO: 93.5 FL (ref 80–99.9)
MCV RBC AUTO: 93.6 FL (ref 80–99.9)
MCV RBC AUTO: 94.2 FL (ref 80–99.9)
MCV RBC AUTO: 94.4 FL (ref 80–99.9)
MCV RBC AUTO: 94.5 FL (ref 80–99.9)
MCV RBC AUTO: 94.7 FL (ref 80–99.9)
MCV RBC AUTO: 94.9 FL (ref 80–99.9)
MCV RBC AUTO: 95.1 FL (ref 80–99.9)
MCV RBC AUTO: 95.6 FL (ref 80–99.9)
MCV RBC AUTO: 96.3 FL (ref 80–99.9)
METHB: 0.1 % (ref 0–1.5)
MODE: ABNORMAL
MONOCYTES ABSOLUTE: 0.64 E9/L (ref 0.1–0.95)
MONOCYTES ABSOLUTE: 0.68 E9/L (ref 0.1–0.95)
MONOCYTES ABSOLUTE: 0.73 E9/L (ref 0.1–0.95)
MONOCYTES ABSOLUTE: 0.85 E9/L (ref 0.1–0.95)
MONOCYTES ABSOLUTE: 1.29 E9/L (ref 0.1–0.95)
MONOCYTES RELATIVE PERCENT: 10 % (ref 2–12)
MONOCYTES RELATIVE PERCENT: 10.3 % (ref 2–12)
MONOCYTES RELATIVE PERCENT: 7.6 % (ref 2–12)
MONOCYTES RELATIVE PERCENT: 9.1 % (ref 2–12)
MONOCYTES RELATIVE PERCENT: 9.3 % (ref 2–12)
NEUTROPHILS ABSOLUTE: 15.28 E9/L (ref 1.8–7.3)
NEUTROPHILS ABSOLUTE: 5.02 E9/L (ref 1.8–7.3)
NEUTROPHILS ABSOLUTE: 5.03 E9/L (ref 1.8–7.3)
NEUTROPHILS ABSOLUTE: 6.57 E9/L (ref 1.8–7.3)
NEUTROPHILS ABSOLUTE: 7.78 E9/L (ref 1.8–7.3)
NEUTROPHILS RELATIVE PERCENT: 75.7 % (ref 43–80)
NEUTROPHILS RELATIVE PERCENT: 78.6 % (ref 43–80)
NEUTROPHILS RELATIVE PERCENT: 82 % (ref 43–80)
NEUTROPHILS RELATIVE PERCENT: 84.8 % (ref 43–80)
NEUTROPHILS RELATIVE PERCENT: 90.2 % (ref 43–80)
O2 CONTENT: 15.2 ML/DL
O2 SATURATION: 95.5 % (ref 92–98.5)
O2HB: 94.3 % (ref 94–97)
OPERATOR ID: ABNORMAL
ORGANISM: ABNORMAL
OVALOCYTES: ABNORMAL
PATIENT TEMP: 37 C
PCO2: 28.2 MMHG (ref 35–45)
PDW BLD-RTO: 14.4 FL (ref 11.5–15)
PDW BLD-RTO: 14.5 FL (ref 11.5–15)
PDW BLD-RTO: 14.5 FL (ref 11.5–15)
PDW BLD-RTO: 14.6 FL (ref 11.5–15)
PDW BLD-RTO: 14.6 FL (ref 11.5–15)
PDW BLD-RTO: 14.7 FL (ref 11.5–15)
PDW BLD-RTO: 14.7 FL (ref 11.5–15)
PDW BLD-RTO: 14.8 FL (ref 11.5–15)
PDW BLD-RTO: 14.9 FL (ref 11.5–15)
PH BLOOD GAS: 7.49 (ref 7.35–7.45)
PLATELET # BLD: 112 E9/L (ref 130–450)
PLATELET # BLD: 119 E9/L (ref 130–450)
PLATELET # BLD: 121 E9/L (ref 130–450)
PLATELET # BLD: 134 E9/L (ref 130–450)
PLATELET # BLD: 136 E9/L (ref 130–450)
PLATELET # BLD: 145 E9/L (ref 130–450)
PLATELET # BLD: 146 E9/L (ref 130–450)
PLATELET # BLD: 161 E9/L (ref 130–450)
PLATELET # BLD: 166 E9/L (ref 130–450)
PLATELET # BLD: 172 E9/L (ref 130–450)
PLATELET # BLD: 191 E9/L (ref 130–450)
PMV BLD AUTO: 10 FL (ref 7–12)
PMV BLD AUTO: 10 FL (ref 7–12)
PMV BLD AUTO: 10.3 FL (ref 7–12)
PMV BLD AUTO: 10.5 FL (ref 7–12)
PMV BLD AUTO: 10.7 FL (ref 7–12)
PMV BLD AUTO: 8.9 FL (ref 7–12)
PMV BLD AUTO: 9.4 FL (ref 7–12)
PMV BLD AUTO: 9.5 FL (ref 7–12)
PMV BLD AUTO: 9.6 FL (ref 7–12)
PMV BLD AUTO: 9.7 FL (ref 7–12)
PMV BLD AUTO: 9.8 FL (ref 7–12)
PO2: 74 MMHG (ref 60–100)
POIKILOCYTES: ABNORMAL
POTASSIUM REFLEX MAGNESIUM: 4.2 MMOL/L (ref 3.5–5)
POTASSIUM REFLEX MAGNESIUM: 4.6 MMOL/L (ref 3.5–5)
POTASSIUM REFLEX MAGNESIUM: 5.1 MMOL/L (ref 3.5–5)
POTASSIUM SERPL-SCNC: 3.8 MMOL/L (ref 3.5–5)
POTASSIUM SERPL-SCNC: 4 MMOL/L (ref 3.5–5)
POTASSIUM SERPL-SCNC: 4.2 MMOL/L (ref 3.5–5)
POTASSIUM SERPL-SCNC: 4.4 MMOL/L (ref 3.5–5)
POTASSIUM SERPL-SCNC: 4.4 MMOL/L (ref 3.5–5)
POTASSIUM SERPL-SCNC: 4.5 MMOL/L (ref 3.5–5)
POTASSIUM SERPL-SCNC: 5.4 MMOL/L (ref 3.5–5)
PRO-BNP: 1406 PG/ML (ref 0–450)
PROTHROMBIN TIME: 14.9 SEC (ref 9.3–12.4)
PROTHROMBIN TIME: 16 SEC (ref 9.3–12.4)
PROTHROMBIN TIME: 17.9 SEC (ref 9.3–12.4)
PROTHROMBIN TIME: 19.4 SEC (ref 9.3–12.4)
PROTHROMBIN TIME: 30.5 SEC (ref 9.3–12.4)
PROTHROMBIN TIME: 30.5 SEC (ref 9.3–12.4)
PROTHROMBIN TIME: 31.3 SEC (ref 9.3–12.4)
PROTHROMBIN TIME: 33.7 SEC (ref 9.3–12.4)
PROTHROMBIN TIME: 39.6 SEC (ref 9.3–12.4)
PROTHROMBIN TIME: 48 SEC (ref 9.3–12.4)
PROTHROMBIN TIME: 56.8 SEC (ref 9.3–12.4)
PROTHROMBIN TIME: 63.4 SEC (ref 9.3–12.4)
RBC # BLD: 2.64 E12/L (ref 3.8–5.8)
RBC # BLD: 3.04 E12/L (ref 3.8–5.8)
RBC # BLD: 3.05 E12/L (ref 3.8–5.8)
RBC # BLD: 3.27 E12/L (ref 3.8–5.8)
RBC # BLD: 3.42 E12/L (ref 3.8–5.8)
RBC # BLD: 3.44 E12/L (ref 3.8–5.8)
RBC # BLD: 3.45 E12/L (ref 3.8–5.8)
RBC # BLD: 3.52 E12/L (ref 3.8–5.8)
RBC # BLD: 3.8 E12/L (ref 3.8–5.8)
RBC # BLD: 3.92 E12/L (ref 3.8–5.8)
RBC # BLD: 4.04 E12/L (ref 3.8–5.8)
RBC # BLD: NORMAL 10*6/UL
RBC # BLD: NORMAL 10*6/UL
SODIUM BLD-SCNC: 136 MMOL/L (ref 132–146)
SODIUM BLD-SCNC: 138 MMOL/L (ref 132–146)
SODIUM BLD-SCNC: 139 MMOL/L (ref 132–146)
SODIUM BLD-SCNC: 139 MMOL/L (ref 132–146)
SODIUM BLD-SCNC: 141 MMOL/L (ref 132–146)
SODIUM BLD-SCNC: 141 MMOL/L (ref 132–146)
SOURCE, BLOOD GAS: ABNORMAL
THB: 11.4 G/DL (ref 11.5–16.5)
TIME ANALYZED: 1839
TOTAL PROTEIN: 6.4 G/DL (ref 6.4–8.3)
TOTAL PROTEIN: 6.5 G/DL (ref 6.4–8.3)
TOTAL PROTEIN: 6.6 G/DL (ref 6.4–8.3)
TROPONIN: 0.02 NG/ML (ref 0–0.03)
WBC # BLD: 15 E9/L (ref 4.5–11.5)
WBC # BLD: 17 E9/L (ref 4.5–11.5)
WBC # BLD: 6.4 E9/L (ref 4.5–11.5)
WBC # BLD: 6.6 E9/L (ref 4.5–11.5)
WBC # BLD: 6.6 E9/L (ref 4.5–11.5)
WBC # BLD: 7.8 E9/L (ref 4.5–11.5)
WBC # BLD: 8 E9/L (ref 4.5–11.5)
WBC # BLD: 8.3 E9/L (ref 4.5–11.5)
WBC # BLD: 9 E9/L (ref 4.5–11.5)
WBC # BLD: 9.2 E9/L (ref 4.5–11.5)
WBC # BLD: 9.3 E9/L (ref 4.5–11.5)

## 2019-01-01 PROCEDURE — 94640 AIRWAY INHALATION TREATMENT: CPT

## 2019-01-01 PROCEDURE — 36415 COLL VENOUS BLD VENIPUNCTURE: CPT

## 2019-01-01 PROCEDURE — 6360000002 HC RX W HCPCS: Performed by: NURSE PRACTITIONER

## 2019-01-01 PROCEDURE — 6370000000 HC RX 637 (ALT 250 FOR IP): Performed by: FAMILY MEDICINE

## 2019-01-01 PROCEDURE — 6360000002 HC RX W HCPCS: Performed by: FAMILY MEDICINE

## 2019-01-01 PROCEDURE — 82805 BLOOD GASES W/O2 SATURATION: CPT

## 2019-01-01 PROCEDURE — 2580000003 HC RX 258: Performed by: FAMILY MEDICINE

## 2019-01-01 PROCEDURE — 6370000000 HC RX 637 (ALT 250 FOR IP): Performed by: NURSE PRACTITIONER

## 2019-01-01 PROCEDURE — G8417 CALC BMI ABV UP PARAM F/U: HCPCS | Performed by: INTERNAL MEDICINE

## 2019-01-01 PROCEDURE — 97161 PT EVAL LOW COMPLEX 20 MIN: CPT

## 2019-01-01 PROCEDURE — 85027 COMPLETE CBC AUTOMATED: CPT

## 2019-01-01 PROCEDURE — 99348 HOME/RES VST EST LOW MDM 30: CPT | Performed by: NURSE PRACTITIONER

## 2019-01-01 PROCEDURE — 85610 PROTHROMBIN TIME: CPT

## 2019-01-01 PROCEDURE — 97530 THERAPEUTIC ACTIVITIES: CPT

## 2019-01-01 PROCEDURE — 85730 THROMBOPLASTIN TIME PARTIAL: CPT

## 2019-01-01 PROCEDURE — 1036F TOBACCO NON-USER: CPT | Performed by: INTERNAL MEDICINE

## 2019-01-01 PROCEDURE — 4040F PNEUMOC VAC/ADMIN/RCVD: CPT | Performed by: NURSE PRACTITIONER

## 2019-01-01 PROCEDURE — 83880 ASSAY OF NATRIURETIC PEPTIDE: CPT

## 2019-01-01 PROCEDURE — C9113 INJ PANTOPRAZOLE SODIUM, VIA: HCPCS | Performed by: NURSE PRACTITIONER

## 2019-01-01 PROCEDURE — 99283 EMERGENCY DEPT VISIT LOW MDM: CPT

## 2019-01-01 PROCEDURE — 94760 N-INVAS EAR/PLS OXIMETRY 1: CPT

## 2019-01-01 PROCEDURE — G8427 DOCREV CUR MEDS BY ELIG CLIN: HCPCS | Performed by: INTERNAL MEDICINE

## 2019-01-01 PROCEDURE — 80048 BASIC METABOLIC PNL TOTAL CA: CPT

## 2019-01-01 PROCEDURE — 2500000003 HC RX 250 WO HCPCS: Performed by: STUDENT IN AN ORGANIZED HEALTH CARE EDUCATION/TRAINING PROGRAM

## 2019-01-01 PROCEDURE — 1200000000 HC SEMI PRIVATE

## 2019-01-01 PROCEDURE — 71045 X-RAY EXAM CHEST 1 VIEW: CPT

## 2019-01-01 PROCEDURE — 99211 OFF/OP EST MAY X REQ PHY/QHP: CPT

## 2019-01-01 PROCEDURE — 94668 MNPJ CHEST WALL SBSQ: CPT

## 2019-01-01 PROCEDURE — 85025 COMPLETE CBC W/AUTO DIFF WBC: CPT

## 2019-01-01 PROCEDURE — G8484 FLU IMMUNIZE NO ADMIN: HCPCS | Performed by: NURSE PRACTITIONER

## 2019-01-01 PROCEDURE — 2060000000 HC ICU INTERMEDIATE R&B

## 2019-01-01 PROCEDURE — 1036F TOBACCO NON-USER: CPT | Performed by: NURSE PRACTITIONER

## 2019-01-01 PROCEDURE — G8417 CALC BMI ABV UP PARAM F/U: HCPCS | Performed by: NURSE PRACTITIONER

## 2019-01-01 PROCEDURE — 86900 BLOOD TYPING SEROLOGIC ABO: CPT

## 2019-01-01 PROCEDURE — 99284 EMERGENCY DEPT VISIT MOD MDM: CPT

## 2019-01-01 PROCEDURE — 6370000000 HC RX 637 (ALT 250 FOR IP)

## 2019-01-01 PROCEDURE — 80053 COMPREHEN METABOLIC PANEL: CPT

## 2019-01-01 PROCEDURE — 2580000003 HC RX 258: Performed by: NURSE PRACTITIONER

## 2019-01-01 PROCEDURE — G8428 CUR MEDS NOT DOCUMENT: HCPCS | Performed by: NURSE PRACTITIONER

## 2019-01-01 PROCEDURE — 36430 TRANSFUSION BLD/BLD COMPNT: CPT

## 2019-01-01 PROCEDURE — 96365 THER/PROPH/DIAG IV INF INIT: CPT

## 2019-01-01 PROCEDURE — 4040F PNEUMOC VAC/ADMIN/RCVD: CPT | Performed by: INTERNAL MEDICINE

## 2019-01-01 PROCEDURE — 1123F ACP DISCUSS/DSCN MKR DOCD: CPT | Performed by: NURSE PRACTITIONER

## 2019-01-01 PROCEDURE — 86920 COMPATIBILITY TEST SPIN: CPT

## 2019-01-01 PROCEDURE — 97535 SELF CARE MNGMENT TRAINING: CPT

## 2019-01-01 PROCEDURE — 96374 THER/PROPH/DIAG INJ IV PUSH: CPT

## 2019-01-01 PROCEDURE — 84484 ASSAY OF TROPONIN QUANT: CPT

## 2019-01-01 PROCEDURE — 87040 BLOOD CULTURE FOR BACTERIA: CPT

## 2019-01-01 PROCEDURE — 2500000003 HC RX 250 WO HCPCS

## 2019-01-01 PROCEDURE — 93005 ELECTROCARDIOGRAM TRACING: CPT | Performed by: EMERGENCY MEDICINE

## 2019-01-01 PROCEDURE — 99344 HOME/RES VST NEW MOD MDM 60: CPT | Performed by: NURSE PRACTITIONER

## 2019-01-01 PROCEDURE — 93000 ELECTROCARDIOGRAM COMPLETE: CPT | Performed by: INTERNAL MEDICINE

## 2019-01-01 PROCEDURE — 74018 RADEX ABDOMEN 1 VIEW: CPT

## 2019-01-01 PROCEDURE — 2700000000 HC OXYGEN THERAPY PER DAY

## 2019-01-01 PROCEDURE — 86850 RBC ANTIBODY SCREEN: CPT

## 2019-01-01 PROCEDURE — 99221 1ST HOSP IP/OBS SF/LOW 40: CPT | Performed by: EMERGENCY MEDICINE

## 2019-01-01 PROCEDURE — P9016 RBC LEUKOCYTES REDUCED: HCPCS

## 2019-01-01 PROCEDURE — 87077 CULTURE AEROBIC IDENTIFY: CPT

## 2019-01-01 PROCEDURE — 6360000002 HC RX W HCPCS: Performed by: EMERGENCY MEDICINE

## 2019-01-01 PROCEDURE — 86901 BLOOD TYPING SEROLOGIC RH(D): CPT

## 2019-01-01 PROCEDURE — 1123F ACP DISCUSS/DSCN MKR DOCD: CPT | Performed by: INTERNAL MEDICINE

## 2019-01-01 PROCEDURE — G8484 FLU IMMUNIZE NO ADMIN: HCPCS | Performed by: INTERNAL MEDICINE

## 2019-01-01 PROCEDURE — 94667 MNPJ CHEST WALL 1ST: CPT

## 2019-01-01 PROCEDURE — 2580000003 HC RX 258: Performed by: EMERGENCY MEDICINE

## 2019-01-01 PROCEDURE — 99285 EMERGENCY DEPT VISIT HI MDM: CPT

## 2019-01-01 PROCEDURE — 99348 HOME/RES VST EST LOW MDM 30: CPT | Performed by: INTERNAL MEDICINE

## 2019-01-01 PROCEDURE — 97165 OT EVAL LOW COMPLEX 30 MIN: CPT

## 2019-01-01 RX ORDER — IPRATROPIUM BROMIDE AND ALBUTEROL SULFATE 2.5; .5 MG/3ML; MG/3ML
1 SOLUTION RESPIRATORY (INHALATION) EVERY 4 HOURS
Status: DISCONTINUED | OUTPATIENT
Start: 2019-01-01 | End: 2019-01-01 | Stop reason: HOSPADM

## 2019-01-01 RX ORDER — SODIUM CHLORIDE 0.9 % (FLUSH) 0.9 %
10 SYRINGE (ML) INJECTION PRN
Status: DISCONTINUED | OUTPATIENT
Start: 2019-01-01 | End: 2019-01-01 | Stop reason: HOSPADM

## 2019-01-01 RX ORDER — POLYETHYLENE GLYCOL 3350 17 G/17G
17 POWDER, FOR SOLUTION ORAL DAILY
Status: DISCONTINUED | OUTPATIENT
Start: 2019-01-01 | End: 2019-01-01 | Stop reason: HOSPADM

## 2019-01-01 RX ORDER — TRANEXAMIC ACID 100 MG/ML
INJECTION, SOLUTION INTRAVENOUS
Status: COMPLETED
Start: 2019-01-01 | End: 2019-01-01

## 2019-01-01 RX ORDER — SODIUM CHLORIDE 0.9 % (FLUSH) 0.9 %
10 SYRINGE (ML) INJECTION EVERY 12 HOURS SCHEDULED
Status: DISCONTINUED | OUTPATIENT
Start: 2019-01-01 | End: 2019-01-01 | Stop reason: HOSPADM

## 2019-01-01 RX ORDER — ACETAMINOPHEN 325 MG/1
650 TABLET ORAL EVERY 4 HOURS PRN
Status: DISCONTINUED | OUTPATIENT
Start: 2019-01-01 | End: 2019-01-01 | Stop reason: ALTCHOICE

## 2019-01-01 RX ORDER — HYDROCODONE BITARTRATE AND ACETAMINOPHEN 10; 325 MG/1; MG/1
1 TABLET ORAL 4 TIMES DAILY
Status: DISCONTINUED | OUTPATIENT
Start: 2019-01-01 | End: 2019-01-01

## 2019-01-01 RX ORDER — ESZOPICLONE 1 MG/1
1 TABLET, FILM COATED ORAL NIGHTLY PRN
Status: DISCONTINUED | OUTPATIENT
Start: 2019-01-01 | End: 2019-01-01 | Stop reason: HOSPADM

## 2019-01-01 RX ORDER — WARFARIN SODIUM 2.5 MG/1
2.5 TABLET ORAL
Status: COMPLETED | OUTPATIENT
Start: 2019-01-01 | End: 2019-01-01

## 2019-01-01 RX ORDER — 0.9 % SODIUM CHLORIDE 0.9 %
500 INTRAVENOUS SOLUTION INTRAVENOUS ONCE
Status: COMPLETED | OUTPATIENT
Start: 2019-01-01 | End: 2019-01-01

## 2019-01-01 RX ORDER — AMOXICILLIN AND CLAVULANATE POTASSIUM 875; 125 MG/1; MG/1
1 TABLET, FILM COATED ORAL EVERY 12 HOURS SCHEDULED
Qty: 20 TABLET | Refills: 0 | Status: SHIPPED | OUTPATIENT
Start: 2019-01-01 | End: 2019-01-01

## 2019-01-01 RX ORDER — HYDROCODONE BITARTRATE AND ACETAMINOPHEN 10; 325 MG/1; MG/1
1 TABLET ORAL EVERY 6 HOURS SCHEDULED
Status: DISCONTINUED | OUTPATIENT
Start: 2019-01-01 | End: 2019-01-01 | Stop reason: HOSPADM

## 2019-01-01 RX ORDER — WARFARIN SODIUM 3 MG/1
3 TABLET ORAL DAILY
Status: DISCONTINUED | OUTPATIENT
Start: 2019-01-01 | End: 2019-01-01 | Stop reason: HOSPADM

## 2019-01-01 RX ORDER — TORSEMIDE 20 MG/1
20 TABLET ORAL DAILY
Status: DISCONTINUED | OUTPATIENT
Start: 2019-01-01 | End: 2019-01-01

## 2019-01-01 RX ORDER — PREDNISONE 1 MG/1
5 TABLET ORAL DAILY
Qty: 10 TABLET | Refills: 0 | Status: SHIPPED | OUTPATIENT
Start: 2019-01-01 | End: 2019-01-01

## 2019-01-01 RX ORDER — PANTOPRAZOLE SODIUM 40 MG/10ML
40 INJECTION, POWDER, LYOPHILIZED, FOR SOLUTION INTRAVENOUS 2 TIMES DAILY
Status: DISCONTINUED | OUTPATIENT
Start: 2019-01-01 | End: 2019-01-01 | Stop reason: HOSPADM

## 2019-01-01 RX ORDER — OLMESARTAN MEDOXOMIL 40 MG/1
40 TABLET ORAL DAILY
COMMUNITY

## 2019-01-01 RX ORDER — WARFARIN SODIUM 2.5 MG/1
2.5 TABLET ORAL DAILY
Status: DISCONTINUED | OUTPATIENT
Start: 2019-01-01 | End: 2019-01-01

## 2019-01-01 RX ORDER — SODIUM POLYSTYRENE SULFONATE 15 G/60ML
25 SUSPENSION ORAL; RECTAL ONCE
Status: COMPLETED | OUTPATIENT
Start: 2019-01-01 | End: 2019-01-01

## 2019-01-01 RX ORDER — GUAIFENESIN 400 MG/1
400 TABLET ORAL 4 TIMES DAILY
Status: DISCONTINUED | OUTPATIENT
Start: 2019-01-01 | End: 2019-01-01 | Stop reason: HOSPADM

## 2019-01-01 RX ORDER — TRANEXAMIC ACID 100 MG/ML
300 INJECTION, SOLUTION INTRAVENOUS ONCE
Status: COMPLETED | OUTPATIENT
Start: 2019-01-01 | End: 2019-01-01

## 2019-01-01 RX ORDER — METOPROLOL SUCCINATE 25 MG/1
25 TABLET, EXTENDED RELEASE ORAL DAILY
Status: DISCONTINUED | OUTPATIENT
Start: 2019-01-01 | End: 2019-01-01 | Stop reason: HOSPADM

## 2019-01-01 RX ORDER — IPRATROPIUM BROMIDE AND ALBUTEROL SULFATE 2.5; .5 MG/3ML; MG/3ML
3 SOLUTION RESPIRATORY (INHALATION) EVERY 4 HOURS
Qty: 360 ML | Refills: 3 | Status: SHIPPED | OUTPATIENT
Start: 2019-01-01

## 2019-01-01 RX ORDER — PREDNISONE 1 MG/1
5 TABLET ORAL DAILY
Status: DISCONTINUED | OUTPATIENT
Start: 2019-01-01 | End: 2019-01-01 | Stop reason: HOSPADM

## 2019-01-01 RX ORDER — M-VIT,TX,IRON,MINS/CALC/FOLIC 27MG-0.4MG
1 TABLET ORAL DAILY
Status: DISCONTINUED | OUTPATIENT
Start: 2019-01-01 | End: 2019-01-01 | Stop reason: HOSPADM

## 2019-01-01 RX ORDER — AMOXICILLIN AND CLAVULANATE POTASSIUM 875; 125 MG/1; MG/1
1 TABLET, FILM COATED ORAL EVERY 12 HOURS SCHEDULED
Status: DISCONTINUED | OUTPATIENT
Start: 2019-01-01 | End: 2019-01-01 | Stop reason: HOSPADM

## 2019-01-01 RX ORDER — WARFARIN SODIUM 3 MG/1
3 TABLET ORAL
Status: DISCONTINUED | OUTPATIENT
Start: 2019-01-01 | End: 2019-01-01

## 2019-01-01 RX ORDER — FAMOTIDINE 20 MG/1
20 TABLET, FILM COATED ORAL DAILY
Qty: 60 TABLET | Refills: 3 | Status: SHIPPED | OUTPATIENT
Start: 2019-01-01 | End: 2019-01-01

## 2019-01-01 RX ORDER — PREDNISONE 1 MG/1
5 TABLET ORAL DAILY
COMMUNITY

## 2019-01-01 RX ORDER — MAGNESIUM HYDROXIDE/ALUMINUM HYDROXICE/SIMETHICONE 120; 1200; 1200 MG/30ML; MG/30ML; MG/30ML
30 SUSPENSION ORAL EVERY 6 HOURS PRN
Status: DISCONTINUED | OUTPATIENT
Start: 2019-01-01 | End: 2019-01-01

## 2019-01-01 RX ORDER — GUAIFENESIN/DEXTROMETHORPHAN 100-10MG/5
5 SYRUP ORAL EVERY 4 HOURS PRN
Status: DISCONTINUED | OUTPATIENT
Start: 2019-01-01 | End: 2019-01-01 | Stop reason: HOSPADM

## 2019-01-01 RX ORDER — FUROSEMIDE 10 MG/ML
20 INJECTION INTRAMUSCULAR; INTRAVENOUS ONCE
Status: COMPLETED | OUTPATIENT
Start: 2019-01-01 | End: 2019-01-01

## 2019-01-01 RX ORDER — WARFARIN SODIUM 2 MG/1
2 TABLET ORAL
Status: COMPLETED | OUTPATIENT
Start: 2019-01-01 | End: 2019-01-01

## 2019-01-01 RX ORDER — OLMESARTAN MEDOXOMIL 40 MG/1
40 TABLET ORAL DAILY
Status: DISCONTINUED | OUTPATIENT
Start: 2019-01-01 | End: 2019-01-01

## 2019-01-01 RX ORDER — 0.9 % SODIUM CHLORIDE 0.9 %
250 INTRAVENOUS SOLUTION INTRAVENOUS ONCE
Status: COMPLETED | OUTPATIENT
Start: 2019-01-01 | End: 2019-01-01

## 2019-01-01 RX ORDER — WARFARIN SODIUM 4 MG/1
4 TABLET ORAL
Status: COMPLETED | OUTPATIENT
Start: 2019-01-01 | End: 2019-01-01

## 2019-01-01 RX ORDER — OLMESARTAN MEDOXOMIL 20 MG/1
40 TABLET ORAL DAILY
Status: DISCONTINUED | OUTPATIENT
Start: 2019-01-01 | End: 2019-01-01 | Stop reason: SDUPTHER

## 2019-01-01 RX ORDER — AMLODIPINE BESYLATE 10 MG/1
10 TABLET ORAL DAILY
Status: DISCONTINUED | OUTPATIENT
Start: 2019-01-01 | End: 2019-01-01 | Stop reason: HOSPADM

## 2019-01-01 RX ORDER — LIDOCAINE HYDROCHLORIDE AND EPINEPHRINE 10; 10 MG/ML; UG/ML
20 INJECTION, SOLUTION INFILTRATION; PERINEURAL ONCE
Status: DISCONTINUED | OUTPATIENT
Start: 2019-01-01 | End: 2019-01-01 | Stop reason: HOSPADM

## 2019-01-01 RX ORDER — TRANEXAMIC ACID 100 MG/ML
1000 INJECTION, SOLUTION INTRAVENOUS ONCE
Status: COMPLETED | OUTPATIENT
Start: 2019-01-01 | End: 2019-01-01

## 2019-01-01 RX ADMIN — IPRATROPIUM BROMIDE AND ALBUTEROL SULFATE 1 AMPULE: .5; 3 SOLUTION RESPIRATORY (INHALATION) at 07:55

## 2019-01-01 RX ADMIN — IPRATROPIUM BROMIDE AND ALBUTEROL SULFATE 1 AMPULE: .5; 3 SOLUTION RESPIRATORY (INHALATION) at 07:51

## 2019-01-01 RX ADMIN — AMPICILLIN SODIUM AND SULBACTAM SODIUM 3 G: 2; 1 INJECTION, POWDER, FOR SOLUTION INTRAMUSCULAR; INTRAVENOUS at 13:07

## 2019-01-01 RX ADMIN — PANTOPRAZOLE SODIUM 40 MG: 40 INJECTION, POWDER, LYOPHILIZED, FOR SOLUTION INTRAVENOUS at 07:07

## 2019-01-01 RX ADMIN — HYDROCODONE BITARTRATE AND ACETAMINOPHEN 1 TABLET: 10; 325 TABLET ORAL at 23:55

## 2019-01-01 RX ADMIN — PANTOPRAZOLE SODIUM 40 MG: 40 INJECTION, POWDER, LYOPHILIZED, FOR SOLUTION INTRAVENOUS at 18:08

## 2019-01-01 RX ADMIN — AMOXICILLIN AND CLAVULANATE POTASSIUM 1 TABLET: 875; 125 TABLET, FILM COATED ORAL at 21:19

## 2019-01-01 RX ADMIN — GUAIFENESIN 400 MG: 400 TABLET ORAL at 20:34

## 2019-01-01 RX ADMIN — HYDROCODONE BITARTRATE AND ACETAMINOPHEN 1 TABLET: 10; 325 TABLET ORAL at 00:20

## 2019-01-01 RX ADMIN — AMLODIPINE BESYLATE 10 MG: 10 TABLET ORAL at 09:43

## 2019-01-01 RX ADMIN — OLMESARTAN MEDOXOMIL 40 MG: 40 TABLET ORAL at 13:38

## 2019-01-01 RX ADMIN — Medication 1 TABLET: at 09:43

## 2019-01-01 RX ADMIN — METOPROLOL SUCCINATE 25 MG: 25 TABLET, EXTENDED RELEASE ORAL at 09:02

## 2019-01-01 RX ADMIN — AMPICILLIN SODIUM AND SULBACTAM SODIUM 3 G: 2; 1 INJECTION, POWDER, FOR SOLUTION INTRAMUSCULAR; INTRAVENOUS at 13:21

## 2019-01-01 RX ADMIN — HYDROCODONE BITARTRATE AND ACETAMINOPHEN 1 TABLET: 10; 325 TABLET ORAL at 12:16

## 2019-01-01 RX ADMIN — GUAIFENESIN 400 MG: 400 TABLET ORAL at 18:08

## 2019-01-01 RX ADMIN — Medication 10 ML: at 20:29

## 2019-01-01 RX ADMIN — FUROSEMIDE 20 MG: 10 INJECTION, SOLUTION INTRAVENOUS at 00:30

## 2019-01-01 RX ADMIN — AMOXICILLIN AND CLAVULANATE POTASSIUM 1 TABLET: 875; 125 TABLET, FILM COATED ORAL at 20:18

## 2019-01-01 RX ADMIN — PREDNISONE 8 MG: 1 TABLET ORAL at 08:41

## 2019-01-01 RX ADMIN — PREDNISONE 5 MG: 5 TABLET ORAL at 08:17

## 2019-01-01 RX ADMIN — HYDROCODONE BITARTRATE AND ACETAMINOPHEN 1 TABLET: 10; 325 TABLET ORAL at 23:56

## 2019-01-01 RX ADMIN — Medication 1 TABLET: at 07:58

## 2019-01-01 RX ADMIN — METOPROLOL SUCCINATE 25 MG: 25 TABLET, EXTENDED RELEASE ORAL at 07:59

## 2019-01-01 RX ADMIN — WARFARIN SODIUM 4 MG: 4 TABLET ORAL at 17:50

## 2019-01-01 RX ADMIN — AMPICILLIN SODIUM AND SULBACTAM SODIUM 3 G: 2; 1 INJECTION, POWDER, FOR SOLUTION INTRAMUSCULAR; INTRAVENOUS at 05:50

## 2019-01-01 RX ADMIN — Medication 10 ML: at 21:19

## 2019-01-01 RX ADMIN — IPRATROPIUM BROMIDE AND ALBUTEROL SULFATE 1 AMPULE: .5; 3 SOLUTION RESPIRATORY (INHALATION) at 12:06

## 2019-01-01 RX ADMIN — IPRATROPIUM BROMIDE AND ALBUTEROL SULFATE 1 AMPULE: .5; 3 SOLUTION RESPIRATORY (INHALATION) at 08:42

## 2019-01-01 RX ADMIN — PREDNISONE 5 MG: 5 TABLET ORAL at 09:04

## 2019-01-01 RX ADMIN — Medication 10 ML: at 08:18

## 2019-01-01 RX ADMIN — IPRATROPIUM BROMIDE AND ALBUTEROL SULFATE 1 AMPULE: .5; 3 SOLUTION RESPIRATORY (INHALATION) at 15:55

## 2019-01-01 RX ADMIN — AMPICILLIN SODIUM AND SULBACTAM SODIUM 3 G: 2; 1 INJECTION, POWDER, FOR SOLUTION INTRAMUSCULAR; INTRAVENOUS at 22:09

## 2019-01-01 RX ADMIN — IPRATROPIUM BROMIDE AND ALBUTEROL SULFATE 1 AMPULE: .5; 3 SOLUTION RESPIRATORY (INHALATION) at 03:57

## 2019-01-01 RX ADMIN — Medication 10 ML: at 22:26

## 2019-01-01 RX ADMIN — AMOXICILLIN AND CLAVULANATE POTASSIUM 1 TABLET: 875; 125 TABLET, FILM COATED ORAL at 08:17

## 2019-01-01 RX ADMIN — PANTOPRAZOLE SODIUM 40 MG: 40 INJECTION, POWDER, LYOPHILIZED, FOR SOLUTION INTRAVENOUS at 05:53

## 2019-01-01 RX ADMIN — TORSEMIDE 20 MG: 20 TABLET ORAL at 08:50

## 2019-01-01 RX ADMIN — IPRATROPIUM BROMIDE AND ALBUTEROL SULFATE 1 AMPULE: .5; 3 SOLUTION RESPIRATORY (INHALATION) at 20:01

## 2019-01-01 RX ADMIN — HYDROCODONE BITARTRATE AND ACETAMINOPHEN 1 TABLET: 10; 325 TABLET ORAL at 05:53

## 2019-01-01 RX ADMIN — SODIUM POLYSTYRENE SULFONATE 25 G: 15 SUSPENSION ORAL; RECTAL at 09:56

## 2019-01-01 RX ADMIN — GUAIFENESIN 400 MG: 400 TABLET ORAL at 08:17

## 2019-01-01 RX ADMIN — GUAIFENESIN 400 MG: 400 TABLET ORAL at 07:58

## 2019-01-01 RX ADMIN — HYDROCODONE BITARTRATE AND ACETAMINOPHEN 1 TABLET: 10; 325 TABLET ORAL at 17:30

## 2019-01-01 RX ADMIN — IPRATROPIUM BROMIDE AND ALBUTEROL SULFATE 1 AMPULE: .5; 3 SOLUTION RESPIRATORY (INHALATION) at 11:58

## 2019-01-01 RX ADMIN — IPRATROPIUM BROMIDE AND ALBUTEROL SULFATE 1 AMPULE: .5; 3 SOLUTION RESPIRATORY (INHALATION) at 00:09

## 2019-01-01 RX ADMIN — SODIUM CHLORIDE 500 ML: 9 INJECTION, SOLUTION INTRAVENOUS at 12:20

## 2019-01-01 RX ADMIN — IPRATROPIUM BROMIDE AND ALBUTEROL SULFATE 1 AMPULE: .5; 3 SOLUTION RESPIRATORY (INHALATION) at 21:06

## 2019-01-01 RX ADMIN — IPRATROPIUM BROMIDE AND ALBUTEROL SULFATE 1 AMPULE: .5; 3 SOLUTION RESPIRATORY (INHALATION) at 04:32

## 2019-01-01 RX ADMIN — AMLODIPINE BESYLATE 10 MG: 10 TABLET ORAL at 08:41

## 2019-01-01 RX ADMIN — GUAIFENESIN 400 MG: 400 TABLET ORAL at 17:50

## 2019-01-01 RX ADMIN — IPRATROPIUM BROMIDE AND ALBUTEROL SULFATE 1 AMPULE: .5; 3 SOLUTION RESPIRATORY (INHALATION) at 07:49

## 2019-01-01 RX ADMIN — HYDROCODONE BITARTRATE AND ACETAMINOPHEN 1 TABLET: 10; 325 TABLET ORAL at 13:05

## 2019-01-01 RX ADMIN — Medication 10 ML: at 20:59

## 2019-01-01 RX ADMIN — MAGNESIUM HYDROXIDE 30 ML: 400 SUSPENSION ORAL at 21:01

## 2019-01-01 RX ADMIN — IPRATROPIUM BROMIDE AND ALBUTEROL SULFATE 1 AMPULE: .5; 3 SOLUTION RESPIRATORY (INHALATION) at 00:42

## 2019-01-01 RX ADMIN — Medication 10 ML: at 22:12

## 2019-01-01 RX ADMIN — Medication 10 ML: at 14:45

## 2019-01-01 RX ADMIN — HYDROCODONE BITARTRATE AND ACETAMINOPHEN 1 TABLET: 10; 325 TABLET ORAL at 09:43

## 2019-01-01 RX ADMIN — POLYETHYLENE GLYCOL 3350 17 G: 17 POWDER, FOR SOLUTION ORAL at 09:01

## 2019-01-01 RX ADMIN — IPRATROPIUM BROMIDE AND ALBUTEROL SULFATE 1 AMPULE: .5; 3 SOLUTION RESPIRATORY (INHALATION) at 05:49

## 2019-01-01 RX ADMIN — GUAIFENESIN 400 MG: 400 TABLET ORAL at 14:04

## 2019-01-01 RX ADMIN — AMLODIPINE BESYLATE 10 MG: 10 TABLET ORAL at 08:50

## 2019-01-01 RX ADMIN — GUAIFENESIN AND DEXTROMETHORPHAN 5 ML: 100; 10 SYRUP ORAL at 20:03

## 2019-01-01 RX ADMIN — AMOXICILLIN AND CLAVULANATE POTASSIUM 1 TABLET: 875; 125 TABLET, FILM COATED ORAL at 09:04

## 2019-01-01 RX ADMIN — GUAIFENESIN 400 MG: 400 TABLET ORAL at 20:32

## 2019-01-01 RX ADMIN — AMLODIPINE BESYLATE 10 MG: 10 TABLET ORAL at 09:02

## 2019-01-01 RX ADMIN — IPRATROPIUM BROMIDE AND ALBUTEROL SULFATE 1 AMPULE: .5; 3 SOLUTION RESPIRATORY (INHALATION) at 05:18

## 2019-01-01 RX ADMIN — AMLODIPINE BESYLATE 10 MG: 10 TABLET ORAL at 08:17

## 2019-01-01 RX ADMIN — GUAIFENESIN 400 MG: 400 TABLET ORAL at 08:41

## 2019-01-01 RX ADMIN — PREDNISONE 5 MG: 5 TABLET ORAL at 09:02

## 2019-01-01 RX ADMIN — HYDROCODONE BITARTRATE AND ACETAMINOPHEN 1 TABLET: 10; 325 TABLET ORAL at 06:09

## 2019-01-01 RX ADMIN — OLMESARTAN MEDOXOMIL 40 MG: 40 TABLET ORAL at 07:58

## 2019-01-01 RX ADMIN — GUAIFENESIN 400 MG: 400 TABLET ORAL at 17:26

## 2019-01-01 RX ADMIN — SODIUM CHLORIDE 3 G: 900 INJECTION INTRAVENOUS at 21:08

## 2019-01-01 RX ADMIN — BISACODYL 10 MG: 5 TABLET, COATED ORAL at 12:16

## 2019-01-01 RX ADMIN — GUAIFENESIN 400 MG: 400 TABLET ORAL at 12:52

## 2019-01-01 RX ADMIN — Medication 10 ML: at 08:53

## 2019-01-01 RX ADMIN — HYDROCODONE BITARTRATE AND ACETAMINOPHEN 1 TABLET: 10; 325 TABLET ORAL at 17:26

## 2019-01-01 RX ADMIN — Medication 10 ML: at 08:42

## 2019-01-01 RX ADMIN — Medication 1 TABLET: at 08:17

## 2019-01-01 RX ADMIN — TRANEXAMIC ACID 300 MG: 100 INJECTION, SOLUTION INTRAVENOUS at 06:50

## 2019-01-01 RX ADMIN — Medication 10 ML: at 05:37

## 2019-01-01 RX ADMIN — HYDROCODONE BITARTRATE AND ACETAMINOPHEN 1 TABLET: 10; 325 TABLET ORAL at 20:17

## 2019-01-01 RX ADMIN — IPRATROPIUM BROMIDE AND ALBUTEROL SULFATE 1 AMPULE: .5; 3 SOLUTION RESPIRATORY (INHALATION) at 03:53

## 2019-01-01 RX ADMIN — IPRATROPIUM BROMIDE AND ALBUTEROL SULFATE 1 AMPULE: .5; 3 SOLUTION RESPIRATORY (INHALATION) at 21:10

## 2019-01-01 RX ADMIN — HYDROCODONE BITARTRATE AND ACETAMINOPHEN 1 TABLET: 10; 325 TABLET ORAL at 20:32

## 2019-01-01 RX ADMIN — IPRATROPIUM BROMIDE AND ALBUTEROL SULFATE 1 AMPULE: .5; 3 SOLUTION RESPIRATORY (INHALATION) at 16:51

## 2019-01-01 RX ADMIN — AMPICILLIN SODIUM AND SULBACTAM SODIUM 3 G: 2; 1 INJECTION, POWDER, FOR SOLUTION INTRAMUSCULAR; INTRAVENOUS at 14:44

## 2019-01-01 RX ADMIN — Medication 1 TABLET: at 09:04

## 2019-01-01 RX ADMIN — AMOXICILLIN AND CLAVULANATE POTASSIUM 1 TABLET: 875; 125 TABLET, FILM COATED ORAL at 09:02

## 2019-01-01 RX ADMIN — IPRATROPIUM BROMIDE AND ALBUTEROL SULFATE 1 AMPULE: .5; 3 SOLUTION RESPIRATORY (INHALATION) at 20:51

## 2019-01-01 RX ADMIN — HYDROCODONE BITARTRATE AND ACETAMINOPHEN 1 TABLET: 10; 325 TABLET ORAL at 00:04

## 2019-01-01 RX ADMIN — AMLODIPINE BESYLATE 10 MG: 10 TABLET ORAL at 07:59

## 2019-01-01 RX ADMIN — SODIUM CHLORIDE 250 ML: 9 INJECTION, SOLUTION INTRAVENOUS at 22:00

## 2019-01-01 RX ADMIN — GUAIFENESIN 400 MG: 400 TABLET ORAL at 12:19

## 2019-01-01 RX ADMIN — PREDNISONE 8 MG: 1 TABLET ORAL at 07:59

## 2019-01-01 RX ADMIN — AMPICILLIN SODIUM AND SULBACTAM SODIUM 3 G: 2; 1 INJECTION, POWDER, FOR SOLUTION INTRAMUSCULAR; INTRAVENOUS at 21:40

## 2019-01-01 RX ADMIN — IPRATROPIUM BROMIDE AND ALBUTEROL SULFATE 1 AMPULE: .5; 3 SOLUTION RESPIRATORY (INHALATION) at 12:37

## 2019-01-01 RX ADMIN — Medication 1 TABLET: at 09:00

## 2019-01-01 RX ADMIN — IPRATROPIUM BROMIDE AND ALBUTEROL SULFATE 1 AMPULE: .5; 3 SOLUTION RESPIRATORY (INHALATION) at 09:07

## 2019-01-01 RX ADMIN — IPRATROPIUM BROMIDE AND ALBUTEROL SULFATE 1 AMPULE: .5; 3 SOLUTION RESPIRATORY (INHALATION) at 12:39

## 2019-01-01 RX ADMIN — POLYETHYLENE GLYCOL 3350 17 G: 17 POWDER, FOR SOLUTION ORAL at 08:18

## 2019-01-01 RX ADMIN — HYDROCODONE BITARTRATE AND ACETAMINOPHEN 1 TABLET: 10; 325 TABLET ORAL at 14:04

## 2019-01-01 RX ADMIN — GUAIFENESIN 400 MG: 400 TABLET ORAL at 13:21

## 2019-01-01 RX ADMIN — HYDROCODONE BITARTRATE AND ACETAMINOPHEN 1 TABLET: 10; 325 TABLET ORAL at 13:21

## 2019-01-01 RX ADMIN — IPRATROPIUM BROMIDE AND ALBUTEROL SULFATE 1 AMPULE: .5; 3 SOLUTION RESPIRATORY (INHALATION) at 11:42

## 2019-01-01 RX ADMIN — HYDROCODONE BITARTRATE AND ACETAMINOPHEN 1 TABLET: 10; 325 TABLET ORAL at 17:50

## 2019-01-01 RX ADMIN — IPRATROPIUM BROMIDE AND ALBUTEROL SULFATE 1 AMPULE: .5; 3 SOLUTION RESPIRATORY (INHALATION) at 23:59

## 2019-01-01 RX ADMIN — IPRATROPIUM BROMIDE AND ALBUTEROL SULFATE 1 AMPULE: .5; 3 SOLUTION RESPIRATORY (INHALATION) at 12:05

## 2019-01-01 RX ADMIN — PREDNISONE 8 MG: 1 TABLET ORAL at 08:54

## 2019-01-01 RX ADMIN — TORSEMIDE 20 MG: 20 TABLET ORAL at 07:58

## 2019-01-01 RX ADMIN — HYDROCODONE BITARTRATE AND ACETAMINOPHEN 1 TABLET: 10; 325 TABLET ORAL at 13:06

## 2019-01-01 RX ADMIN — IPRATROPIUM BROMIDE AND ALBUTEROL SULFATE 1 AMPULE: .5; 3 SOLUTION RESPIRATORY (INHALATION) at 01:03

## 2019-01-01 RX ADMIN — AMPICILLIN SODIUM 2 G: 2 INJECTION, POWDER, FOR SOLUTION INTRAMUSCULAR; INTRAVENOUS at 13:45

## 2019-01-01 RX ADMIN — WARFARIN SODIUM 2 MG: 2 TABLET ORAL at 18:20

## 2019-01-01 RX ADMIN — GUAIFENESIN 400 MG: 400 TABLET ORAL at 20:29

## 2019-01-01 RX ADMIN — TORSEMIDE 20 MG: 20 TABLET ORAL at 09:43

## 2019-01-01 RX ADMIN — AMOXICILLIN AND CLAVULANATE POTASSIUM 1 TABLET: 875; 125 TABLET, FILM COATED ORAL at 20:29

## 2019-01-01 RX ADMIN — GUAIFENESIN 400 MG: 400 TABLET ORAL at 17:13

## 2019-01-01 RX ADMIN — GUAIFENESIN 400 MG: 400 TABLET ORAL at 09:01

## 2019-01-01 RX ADMIN — AMLODIPINE BESYLATE 10 MG: 10 TABLET ORAL at 09:56

## 2019-01-01 RX ADMIN — IPRATROPIUM BROMIDE AND ALBUTEROL SULFATE 1 AMPULE: .5; 3 SOLUTION RESPIRATORY (INHALATION) at 08:54

## 2019-01-01 RX ADMIN — PANTOPRAZOLE SODIUM 40 MG: 40 INJECTION, POWDER, LYOPHILIZED, FOR SOLUTION INTRAVENOUS at 17:50

## 2019-01-01 RX ADMIN — IPRATROPIUM BROMIDE AND ALBUTEROL SULFATE 1 AMPULE: .5; 3 SOLUTION RESPIRATORY (INHALATION) at 09:04

## 2019-01-01 RX ADMIN — HYDROCODONE BITARTRATE AND ACETAMINOPHEN 1 TABLET: 10; 325 TABLET ORAL at 17:13

## 2019-01-01 RX ADMIN — TRANEXAMIC ACID 1000 MG: 1 INJECTION, SOLUTION INTRAVENOUS at 07:16

## 2019-01-01 RX ADMIN — PREDNISONE 8 MG: 1 TABLET ORAL at 09:42

## 2019-01-01 RX ADMIN — GUAIFENESIN 400 MG: 400 TABLET ORAL at 20:17

## 2019-01-01 RX ADMIN — IPRATROPIUM BROMIDE AND ALBUTEROL SULFATE 1 AMPULE: .5; 3 SOLUTION RESPIRATORY (INHALATION) at 16:03

## 2019-01-01 RX ADMIN — Medication 10 ML: at 20:04

## 2019-01-01 RX ADMIN — IPRATROPIUM BROMIDE AND ALBUTEROL SULFATE 1 AMPULE: .5; 3 SOLUTION RESPIRATORY (INHALATION) at 01:57

## 2019-01-01 RX ADMIN — PANTOPRAZOLE SODIUM 40 MG: 40 INJECTION, POWDER, LYOPHILIZED, FOR SOLUTION INTRAVENOUS at 05:37

## 2019-01-01 RX ADMIN — GUAIFENESIN 400 MG: 400 TABLET ORAL at 09:04

## 2019-01-01 RX ADMIN — IPRATROPIUM BROMIDE AND ALBUTEROL SULFATE 1 AMPULE: .5; 3 SOLUTION RESPIRATORY (INHALATION) at 11:34

## 2019-01-01 RX ADMIN — AMPICILLIN SODIUM AND SULBACTAM SODIUM 3 G: 2; 1 INJECTION, POWDER, FOR SOLUTION INTRAMUSCULAR; INTRAVENOUS at 13:05

## 2019-01-01 RX ADMIN — POLYETHYLENE GLYCOL 3350 17 G: 17 POWDER, FOR SOLUTION ORAL at 09:04

## 2019-01-01 RX ADMIN — METOPROLOL SUCCINATE 25 MG: 25 TABLET, EXTENDED RELEASE ORAL at 08:41

## 2019-01-01 RX ADMIN — HYDROCODONE BITARTRATE AND ACETAMINOPHEN 1 TABLET: 10; 325 TABLET ORAL at 20:03

## 2019-01-01 RX ADMIN — AMOXICILLIN AND CLAVULANATE POTASSIUM 1 TABLET: 875; 125 TABLET, FILM COATED ORAL at 20:58

## 2019-01-01 RX ADMIN — WARFARIN SODIUM 2.5 MG: 2.5 TABLET ORAL at 17:29

## 2019-01-01 RX ADMIN — METOPROLOL SUCCINATE 25 MG: 25 TABLET, EXTENDED RELEASE ORAL at 09:04

## 2019-01-01 RX ADMIN — IPRATROPIUM BROMIDE AND ALBUTEROL SULFATE 1 AMPULE: .5; 3 SOLUTION RESPIRATORY (INHALATION) at 21:40

## 2019-01-01 RX ADMIN — HYDROCODONE BITARTRATE AND ACETAMINOPHEN 1 TABLET: 10; 325 TABLET ORAL at 13:20

## 2019-01-01 RX ADMIN — Medication 1 TABLET: at 09:56

## 2019-01-01 RX ADMIN — GUAIFENESIN 400 MG: 400 TABLET ORAL at 09:42

## 2019-01-01 RX ADMIN — GUAIFENESIN 400 MG: 400 TABLET ORAL at 13:20

## 2019-01-01 RX ADMIN — HYDROCODONE BITARTRATE AND ACETAMINOPHEN 1 TABLET: 10; 325 TABLET ORAL at 20:34

## 2019-01-01 RX ADMIN — AMLODIPINE BESYLATE 10 MG: 10 TABLET ORAL at 09:04

## 2019-01-01 RX ADMIN — AMPICILLIN SODIUM AND SULBACTAM SODIUM 3 G: 2; 1 INJECTION, POWDER, FOR SOLUTION INTRAMUSCULAR; INTRAVENOUS at 05:43

## 2019-01-01 RX ADMIN — MAGNESIUM HYDROXIDE 30 ML: 400 SUSPENSION ORAL at 06:05

## 2019-01-01 RX ADMIN — IPRATROPIUM BROMIDE AND ALBUTEROL SULFATE 1 AMPULE: .5; 3 SOLUTION RESPIRATORY (INHALATION) at 16:31

## 2019-01-01 RX ADMIN — Medication 10 ML: at 20:19

## 2019-01-01 RX ADMIN — HYDROCODONE BITARTRATE AND ACETAMINOPHEN 1 TABLET: 10; 325 TABLET ORAL at 16:44

## 2019-01-01 RX ADMIN — GUAIFENESIN 400 MG: 400 TABLET ORAL at 09:56

## 2019-01-01 RX ADMIN — Medication 1 TABLET: at 08:41

## 2019-01-01 RX ADMIN — IPRATROPIUM BROMIDE AND ALBUTEROL SULFATE 1 AMPULE: .5; 3 SOLUTION RESPIRATORY (INHALATION) at 22:15

## 2019-01-01 RX ADMIN — GUAIFENESIN 400 MG: 400 TABLET ORAL at 21:19

## 2019-01-01 RX ADMIN — IPRATROPIUM BROMIDE AND ALBUTEROL SULFATE 1 AMPULE: .5; 3 SOLUTION RESPIRATORY (INHALATION) at 04:22

## 2019-01-01 RX ADMIN — IPRATROPIUM BROMIDE AND ALBUTEROL SULFATE 1 AMPULE: .5; 3 SOLUTION RESPIRATORY (INHALATION) at 16:12

## 2019-01-01 RX ADMIN — POLYETHYLENE GLYCOL 3350 17 G: 17 POWDER, FOR SOLUTION ORAL at 09:57

## 2019-01-01 RX ADMIN — AMPICILLIN SODIUM AND SULBACTAM SODIUM 3 G: 2; 1 INJECTION, POWDER, FOR SOLUTION INTRAMUSCULAR; INTRAVENOUS at 21:50

## 2019-01-01 RX ADMIN — GUAIFENESIN 400 MG: 400 TABLET ORAL at 20:58

## 2019-01-01 RX ADMIN — IPRATROPIUM BROMIDE AND ALBUTEROL SULFATE 1 AMPULE: .5; 3 SOLUTION RESPIRATORY (INHALATION) at 05:54

## 2019-01-01 RX ADMIN — IPRATROPIUM BROMIDE AND ALBUTEROL SULFATE 1 AMPULE: .5; 3 SOLUTION RESPIRATORY (INHALATION) at 12:50

## 2019-01-01 RX ADMIN — Medication 10 ML: at 09:02

## 2019-01-01 RX ADMIN — PANTOPRAZOLE SODIUM 40 MG: 40 INJECTION, POWDER, LYOPHILIZED, FOR SOLUTION INTRAVENOUS at 17:31

## 2019-01-01 RX ADMIN — HYDROCODONE BITARTRATE AND ACETAMINOPHEN 1 TABLET: 10; 325 TABLET ORAL at 05:37

## 2019-01-01 RX ADMIN — METOPROLOL SUCCINATE 25 MG: 25 TABLET, EXTENDED RELEASE ORAL at 08:52

## 2019-01-01 RX ADMIN — GUAIFENESIN 400 MG: 400 TABLET ORAL at 13:05

## 2019-01-01 RX ADMIN — PREDNISONE 5 MG: 5 TABLET ORAL at 09:56

## 2019-01-01 RX ADMIN — AMOXICILLIN AND CLAVULANATE POTASSIUM 1 TABLET: 875; 125 TABLET, FILM COATED ORAL at 09:56

## 2019-01-01 RX ADMIN — TRANEXAMIC ACID 1000 MG: 100 INJECTION, SOLUTION INTRAVENOUS at 08:12

## 2019-01-01 RX ADMIN — HYDROCODONE BITARTRATE AND ACETAMINOPHEN 1 TABLET: 10; 325 TABLET ORAL at 08:41

## 2019-01-01 RX ADMIN — HYDROCODONE BITARTRATE AND ACETAMINOPHEN 1 TABLET: 10; 325 TABLET ORAL at 08:17

## 2019-01-01 RX ADMIN — OLMESARTAN MEDOXOMIL 40 MG: 40 TABLET ORAL at 09:45

## 2019-01-01 RX ADMIN — POLYETHYLENE GLYCOL 3350 17 G: 17 POWDER, FOR SOLUTION ORAL at 17:30

## 2019-01-01 RX ADMIN — AMPICILLIN SODIUM AND SULBACTAM SODIUM 3 G: 2; 1 INJECTION, POWDER, FOR SOLUTION INTRAMUSCULAR; INTRAVENOUS at 06:01

## 2019-01-01 RX ADMIN — IPRATROPIUM BROMIDE AND ALBUTEROL SULFATE 1 AMPULE: .5; 3 SOLUTION RESPIRATORY (INHALATION) at 20:14

## 2019-01-01 RX ADMIN — AMPICILLIN SODIUM AND SULBACTAM SODIUM 3 G: 2; 1 INJECTION, POWDER, FOR SOLUTION INTRAMUSCULAR; INTRAVENOUS at 05:40

## 2019-01-01 RX ADMIN — IPRATROPIUM BROMIDE AND ALBUTEROL SULFATE 1 AMPULE: .5; 3 SOLUTION RESPIRATORY (INHALATION) at 15:51

## 2019-01-01 RX ADMIN — METOPROLOL SUCCINATE 25 MG: 25 TABLET, EXTENDED RELEASE ORAL at 08:17

## 2019-01-01 RX ADMIN — IPRATROPIUM BROMIDE AND ALBUTEROL SULFATE 1 AMPULE: .5; 3 SOLUTION RESPIRATORY (INHALATION) at 16:33

## 2019-01-01 RX ADMIN — METOPROLOL SUCCINATE 25 MG: 25 TABLET, EXTENDED RELEASE ORAL at 09:56

## 2019-01-01 RX ADMIN — HYDROCODONE BITARTRATE AND ACETAMINOPHEN 1 TABLET: 10; 325 TABLET ORAL at 04:11

## 2019-01-01 RX ADMIN — Medication 10 ML: at 07:59

## 2019-01-01 RX ADMIN — Medication 10 ML: at 09:44

## 2019-01-01 RX ADMIN — HYDROCODONE BITARTRATE AND ACETAMINOPHEN 1 TABLET: 10; 325 TABLET ORAL at 08:51

## 2019-01-01 RX ADMIN — Medication 1 TABLET: at 08:51

## 2019-01-01 RX ADMIN — GUAIFENESIN 400 MG: 400 TABLET ORAL at 17:41

## 2019-01-01 RX ADMIN — Medication 10 ML: at 20:32

## 2019-01-01 RX ADMIN — Medication 10 ML: at 09:06

## 2019-01-01 RX ADMIN — METOPROLOL SUCCINATE 25 MG: 25 TABLET, EXTENDED RELEASE ORAL at 09:43

## 2019-01-01 RX ADMIN — IPRATROPIUM BROMIDE AND ALBUTEROL SULFATE 1 AMPULE: .5; 3 SOLUTION RESPIRATORY (INHALATION) at 00:08

## 2019-01-01 RX ADMIN — GUAIFENESIN 400 MG: 400 TABLET ORAL at 17:30

## 2019-01-01 RX ADMIN — Medication 10 ML: at 09:58

## 2019-01-01 RX ADMIN — POLYETHYLENE GLYCOL 3350 17 G: 17 POWDER, FOR SOLUTION ORAL at 08:42

## 2019-01-01 RX ADMIN — SODIUM CHLORIDE 500 ML: 9 INJECTION, SOLUTION INTRAVENOUS at 19:50

## 2019-01-01 RX ADMIN — HYDROCODONE BITARTRATE AND ACETAMINOPHEN 1 TABLET: 10; 325 TABLET ORAL at 18:08

## 2019-01-01 ASSESSMENT — PAIN SCALES - GENERAL
PAINLEVEL_OUTOF10: 7
PAINLEVEL_OUTOF10: 7
PAINLEVEL_OUTOF10: 0
PAINLEVEL_OUTOF10: 0
PAINLEVEL_OUTOF10: 5
PAINLEVEL_OUTOF10: 7
PAINLEVEL_OUTOF10: 0
PAINLEVEL_OUTOF10: 7
PAINLEVEL_OUTOF10: 6
PAINLEVEL_OUTOF10: 5
PAINLEVEL_OUTOF10: 6
PAINLEVEL_OUTOF10: 5
PAINLEVEL_OUTOF10: 6
PAINLEVEL_OUTOF10: 5
PAINLEVEL_OUTOF10: 5
PAINLEVEL_OUTOF10: 6
PAINLEVEL_OUTOF10: 0
PAINLEVEL_OUTOF10: 4
PAINLEVEL_OUTOF10: 0
PAINLEVEL_OUTOF10: 5
PAINLEVEL_OUTOF10: 7
PAINLEVEL_OUTOF10: 5
PAINLEVEL_OUTOF10: 8
PAINLEVEL_OUTOF10: 7
PAINLEVEL_OUTOF10: 6
PAINLEVEL_OUTOF10: 5
PAINLEVEL_OUTOF10: 7
PAINLEVEL_OUTOF10: 2
PAINLEVEL_OUTOF10: 5
PAINLEVEL_OUTOF10: 0
PAINLEVEL_OUTOF10: 6
PAINLEVEL_OUTOF10: 2
PAINLEVEL_OUTOF10: 5
PAINLEVEL_OUTOF10: 5
PAINLEVEL_OUTOF10: 0
PAINLEVEL_OUTOF10: 3
PAINLEVEL_OUTOF10: 5
PAINLEVEL_OUTOF10: 6
PAINLEVEL_OUTOF10: 0
PAINLEVEL_OUTOF10: 4
PAINLEVEL_OUTOF10: 5

## 2019-01-01 ASSESSMENT — PAIN DESCRIPTION - LOCATION
LOCATION: GENERALIZED
LOCATION: GENERALIZED
LOCATION: BACK
LOCATION: GENERALIZED
LOCATION: BACK;GENERALIZED
LOCATION: GENERALIZED
LOCATION: GENERALIZED
LOCATION: BACK
LOCATION: BACK
LOCATION: BACK;GENERALIZED
LOCATION: BACK
LOCATION: GENERALIZED
LOCATION: OTHER (COMMENT);GENERALIZED
LOCATION: BACK
LOCATION: GENERALIZED

## 2019-01-01 ASSESSMENT — PAIN DESCRIPTION - FREQUENCY
FREQUENCY: CONTINUOUS

## 2019-01-01 ASSESSMENT — PAIN - FUNCTIONAL ASSESSMENT
PAIN_FUNCTIONAL_ASSESSMENT: PREVENTS OR INTERFERES SOME ACTIVE ACTIVITIES AND ADLS
PAIN_FUNCTIONAL_ASSESSMENT: PREVENTS OR INTERFERES SOME ACTIVE ACTIVITIES AND ADLS
PAIN_FUNCTIONAL_ASSESSMENT: PREVENTS OR INTERFERES WITH MANY ACTIVE NOT PASSIVE ACTIVITIES
PAIN_FUNCTIONAL_ASSESSMENT: ACTIVITIES ARE NOT PREVENTED
PAIN_FUNCTIONAL_ASSESSMENT: PREVENTS OR INTERFERES SOME ACTIVE ACTIVITIES AND ADLS

## 2019-01-01 ASSESSMENT — ENCOUNTER SYMPTOMS
NAUSEA: 0
CHEST TIGHTNESS: 0
DIARRHEA: 0
ABDOMINAL DISTENTION: 0
NAUSEA: 0
CONSTIPATION: 0
RHINORRHEA: 0
ABDOMINAL PAIN: 0
DIARRHEA: 0
SINUS PAIN: 0
COUGH: 0
SORE THROAT: 0
VOICE CHANGE: 0
TROUBLE SWALLOWING: 0
VOMITING: 0
SHORTNESS OF BREATH: 0
PHOTOPHOBIA: 0
CHEST TIGHTNESS: 0
SORE THROAT: 0
SHORTNESS OF BREATH: 0

## 2019-01-01 ASSESSMENT — PAIN DESCRIPTION - ONSET
ONSET: ON-GOING

## 2019-01-01 ASSESSMENT — PAIN DESCRIPTION - PAIN TYPE
TYPE: CHRONIC PAIN
TYPE: ACUTE PAIN
TYPE: ACUTE PAIN
TYPE: CHRONIC PAIN
TYPE: ACUTE PAIN
TYPE: CHRONIC PAIN
TYPE: CHRONIC PAIN

## 2019-01-01 ASSESSMENT — PAIN DESCRIPTION - ORIENTATION
ORIENTATION: RIGHT;LEFT;UPPER;LOWER
ORIENTATION: RIGHT;LEFT;UPPER;LOWER
ORIENTATION: MID;LOWER

## 2019-01-01 ASSESSMENT — PAIN DESCRIPTION - PROGRESSION
CLINICAL_PROGRESSION: NOT CHANGED
CLINICAL_PROGRESSION: RESOLVED
CLINICAL_PROGRESSION: NOT CHANGED
CLINICAL_PROGRESSION: GRADUALLY IMPROVING
CLINICAL_PROGRESSION: NOT CHANGED
CLINICAL_PROGRESSION: RESOLVED
CLINICAL_PROGRESSION: RAPIDLY WORSENING
CLINICAL_PROGRESSION: GRADUALLY WORSENING
CLINICAL_PROGRESSION: NOT CHANGED
CLINICAL_PROGRESSION: GRADUALLY WORSENING

## 2019-01-01 ASSESSMENT — PAIN DESCRIPTION - DESCRIPTORS
DESCRIPTORS: ACHING;DISCOMFORT;NAGGING
DESCRIPTORS: ACHING;CONSTANT;SORE
DESCRIPTORS: ACHING;DISCOMFORT;SORE
DESCRIPTORS: ACHING;CRUSHING;SORE
DESCRIPTORS: ACHING;DISCOMFORT;SORE
DESCRIPTORS: ACHING;DISCOMFORT;SORE
DESCRIPTORS: ACHING;DISCOMFORT
DESCRIPTORS: ACHING
DESCRIPTORS: ACHING;CRUSHING;SORE
DESCRIPTORS: ACHING;DISCOMFORT;SORE
DESCRIPTORS: ACHING;DISCOMFORT;SORE
DESCRIPTORS: ACHING

## 2019-01-14 ENCOUNTER — HOSPITAL ENCOUNTER (OUTPATIENT)
Dept: PHARMACY | Age: 84
Setting detail: THERAPIES SERIES
Discharge: HOME | End: 2019-01-14
Payer: MEDICARE

## 2019-01-14 VITALS
WEIGHT: 219 LBS | BODY MASS INDEX: 31.42 KG/M2 | DIASTOLIC BLOOD PRESSURE: 73 MMHG | HEART RATE: 78 BPM | SYSTOLIC BLOOD PRESSURE: 120 MMHG

## 2019-01-14 DIAGNOSIS — Z95.2 HISTORY OF MITRAL VALVE REPLACEMENT WITH MECHANICAL VALVE: ICD-10-CM

## 2019-01-14 LAB — INTERNATIONAL NORMALIZATION RATIO, POC: 2.7

## 2019-01-14 PROCEDURE — 99211 OFF/OP EST MAY X REQ PHY/QHP: CPT

## 2019-01-14 PROCEDURE — 85610 PROTHROMBIN TIME: CPT

## 2019-02-11 ENCOUNTER — HOSPITAL ENCOUNTER (OUTPATIENT)
Dept: PHARMACY | Age: 84
Setting detail: THERAPIES SERIES
Discharge: HOME | End: 2019-02-11
Payer: MEDICARE

## 2019-02-11 VITALS
BODY MASS INDEX: 31.37 KG/M2 | SYSTOLIC BLOOD PRESSURE: 133 MMHG | DIASTOLIC BLOOD PRESSURE: 69 MMHG | WEIGHT: 218.6 LBS | HEART RATE: 78 BPM

## 2019-02-11 DIAGNOSIS — Z95.2 HISTORY OF MITRAL VALVE REPLACEMENT WITH MECHANICAL VALVE: ICD-10-CM

## 2019-02-11 LAB — INTERNATIONAL NORMALIZATION RATIO, POC: 2.9

## 2019-02-11 PROCEDURE — 85610 PROTHROMBIN TIME: CPT

## 2019-02-11 PROCEDURE — 99211 OFF/OP EST MAY X REQ PHY/QHP: CPT

## 2019-03-11 ENCOUNTER — HOSPITAL ENCOUNTER (OUTPATIENT)
Dept: PHARMACY | Age: 84
Setting detail: THERAPIES SERIES
Discharge: HOME | End: 2019-03-11
Payer: MEDICARE

## 2019-03-11 VITALS
SYSTOLIC BLOOD PRESSURE: 143 MMHG | HEART RATE: 85 BPM | WEIGHT: 221.8 LBS | BODY MASS INDEX: 31.82 KG/M2 | DIASTOLIC BLOOD PRESSURE: 78 MMHG

## 2019-03-11 DIAGNOSIS — Z95.2 HISTORY OF MITRAL VALVE REPLACEMENT WITH MECHANICAL VALVE: ICD-10-CM

## 2019-03-11 LAB — INTERNATIONAL NORMALIZATION RATIO, POC: 2.7

## 2019-03-11 PROCEDURE — 99211 OFF/OP EST MAY X REQ PHY/QHP: CPT

## 2019-03-11 PROCEDURE — 85610 PROTHROMBIN TIME: CPT

## 2019-03-13 ENCOUNTER — TELEPHONE (OUTPATIENT)
Dept: PHARMACY | Age: 84
End: 2019-03-13

## 2019-04-09 ENCOUNTER — TELEPHONE (OUTPATIENT)
Dept: CARDIOLOGY CLINIC | Age: 84
End: 2019-04-09

## 2019-04-10 NOTE — TELEPHONE ENCOUNTER
Discussed valvular heart status with patient's daughter. Please let Miryam Seymour at Dr. Mary Gaspar office know that patient is going hold off on surgery at this time until he sees me in the office to discuss the situation further.

## 2019-04-10 NOTE — TELEPHONE ENCOUNTER
Patricia notified and instructed on holding off with surgery at this time. She stated understanding.

## 2019-04-11 ENCOUNTER — HOSPITAL ENCOUNTER (OUTPATIENT)
Dept: PHARMACY | Age: 84
Setting detail: THERAPIES SERIES
Discharge: HOME | End: 2019-04-11
Payer: MEDICARE

## 2019-04-11 VITALS
SYSTOLIC BLOOD PRESSURE: 135 MMHG | DIASTOLIC BLOOD PRESSURE: 76 MMHG | WEIGHT: 220 LBS | BODY MASS INDEX: 31.57 KG/M2 | HEART RATE: 83 BPM

## 2019-04-11 DIAGNOSIS — Z95.2 HISTORY OF MITRAL VALVE REPLACEMENT WITH MECHANICAL VALVE: ICD-10-CM

## 2019-04-11 LAB — INTERNATIONAL NORMALIZATION RATIO, POC: 3.4

## 2019-04-11 PROCEDURE — 99211 OFF/OP EST MAY X REQ PHY/QHP: CPT

## 2019-04-11 PROCEDURE — 85610 PROTHROMBIN TIME: CPT

## 2019-04-11 NOTE — PROGRESS NOTES
[address] [facility]    Patient Findings     Positives:   Upcoming dental procedure (ROOT CANAL ON 4/15; NO CHANGES IN WARFARIN), Change in medications (PREDNISONE DOSE DECREASED FROM 8 MG TO 5 MG PO DAILY), Bruising (NOTHING OUT OF THE ORDINARY)    Negatives:   Signs/symptoms of thrombosis, Signs/symptoms of bleeding, Laboratory test error suspected, Change in health, Change in alcohol use, Change in activity, Upcoming invasive procedure, Emergency department visit, Missed doses, Extra doses, Change in diet/appetite, Hospital admission, Other complaints         Patient  reports that he has quit smoking. He has never used smokeless tobacco.     Assessment/Plan:  Warfarin indication: MECHANICAL MVR AND ATRIAL FIBRILLATION      INR today is therapeutic at 3.4, goal is 2.5-3.5    Warfarin Dose: SAME; (4 MG ON Thursday AND 3 MG ON ALL OTHER DAYS)    Follow Up: 4 weeks    Patient understands dosing directions and information discussed. Dosing schedule and follow up appointment given to patient. Patient acknowledges working in consult agreement with pharmacist as referred by his/her physician.     Adolph Rodrigues PharmD 4/11/2019 9:14 AM

## 2019-05-09 ENCOUNTER — HOSPITAL ENCOUNTER (OUTPATIENT)
Dept: PHARMACY | Age: 84
Setting detail: THERAPIES SERIES
Discharge: HOME | End: 2019-05-09
Payer: MEDICARE

## 2019-05-09 VITALS
DIASTOLIC BLOOD PRESSURE: 69 MMHG | HEART RATE: 80 BPM | SYSTOLIC BLOOD PRESSURE: 115 MMHG | BODY MASS INDEX: 31.28 KG/M2 | WEIGHT: 218 LBS

## 2019-05-09 DIAGNOSIS — Z95.2 HISTORY OF MITRAL VALVE REPLACEMENT WITH MECHANICAL VALVE: ICD-10-CM

## 2019-05-09 LAB — INTERNATIONAL NORMALIZATION RATIO, POC: 3.7

## 2019-05-09 PROCEDURE — 85610 PROTHROMBIN TIME: CPT

## 2019-05-09 PROCEDURE — 99211 OFF/OP EST MAY X REQ PHY/QHP: CPT

## 2019-05-09 NOTE — PROGRESS NOTES
[address] [facility]    Patient Findings     Positives:   Upcoming invasive procedure (WILL BE HAVING SKIN CANCER REMOVED, BUT WILL FIRST DISCUSS WITH DR. Ashlyn Waddell IN \[facility]"), Change in medications (PREDNISONE INCREASED FROM 5 MG TO 8 MG DAILY)    Negatives:   Signs/symptoms of thrombosis, Signs/symptoms of bleeding, Laboratory test error suspected, Change in health, Change in alcohol use, Change in activity, Emergency department visit, Upcoming dental procedure, Missed doses, Extra doses, Change in diet/appetite, Hospital admission, Bruising, Other complaints    Comments:   June 17TH - DR. REDDY         Patient  reports that he has quit smoking. He has never used smokeless tobacco.     Assessment/Plan:  Warfarin indication: mechanical MVR and atrial fibrillation      INR today is SUPRAtherapeutic at 3.7, goal is 2.5-3.5. NO KNOWN CAUSE. Warfarin Dose: HOLD TODAY'S DOSE THEN DECREASE WEEKLY DOSE TO 3 MG DAILY    Follow Up: 2 weeks    Patient understands dosing directions and information discussed. Dosing schedule and follow up appointment given to patient. Patient acknowledges working in consult agreement with pharmacist as referred by his/her physician.     Zachery Mejia PharmD 5/9/2019 9:25 AM

## 2019-05-23 ENCOUNTER — HOSPITAL ENCOUNTER (OUTPATIENT)
Dept: PHARMACY | Age: 84
Setting detail: THERAPIES SERIES
Discharge: HOME | End: 2019-05-23
Payer: MEDICARE

## 2019-05-23 VITALS
HEART RATE: 73 BPM | BODY MASS INDEX: 31.11 KG/M2 | DIASTOLIC BLOOD PRESSURE: 65 MMHG | WEIGHT: 216.8 LBS | SYSTOLIC BLOOD PRESSURE: 107 MMHG

## 2019-05-23 DIAGNOSIS — Z95.2 HISTORY OF MITRAL VALVE REPLACEMENT WITH MECHANICAL VALVE: ICD-10-CM

## 2019-05-23 LAB — INTERNATIONAL NORMALIZATION RATIO, POC: 3.8

## 2019-05-23 PROCEDURE — 85610 PROTHROMBIN TIME: CPT

## 2019-05-23 PROCEDURE — 99211 OFF/OP EST MAY X REQ PHY/QHP: CPT

## 2019-05-23 NOTE — PROGRESS NOTES
[address] [facility]    Patient Findings     Negatives:   Signs/symptoms of thrombosis, Signs/symptoms of bleeding, Laboratory test error suspected, Change in health, Change in alcohol use, Change in activity, Upcoming invasive procedure, Emergency department visit, Upcoming dental procedure, Missed doses, Extra doses, Change in medications, Change in diet/appetite, Hospital admission, Bruising, Other complaints    Comments:   PCP NEXT WEEK   DR. REDDY June 17TH         Patient  reports that he has quit smoking. He has never used smokeless tobacco.     Assessment/Plan:  Warfarin indication: mechanical MVR and atrial fibrillation        INR today is SUPRAtherapeutic at 3.8, goal is 2.5-3.5. NO KNOWN CAUSE; WARFARIN DOSE WAS DECREASED AT LAST VISIT. Warfarin Dose: HOLD TODAY THEN DECREASE WEEKLY DOSE BY 9.5% TO 2 MG EVERY MON./FRI.; 3 MG ALL OTHER DAYS    Follow Up: 2 weeks    Patient understands dosing directions and information discussed. Dosing schedule and follow up appointment given to patient. Patient acknowledges working in consult agreement with pharmacist as referred by his/her physician.     Kasie Caballero PharmD 5/23/2019 9:21 AM

## 2019-06-07 ENCOUNTER — HOSPITAL ENCOUNTER (OUTPATIENT)
Dept: PHARMACY | Age: 84
Setting detail: THERAPIES SERIES
Discharge: HOME | End: 2019-06-07
Payer: MEDICARE

## 2019-06-07 VITALS
DIASTOLIC BLOOD PRESSURE: 67 MMHG | WEIGHT: 219.6 LBS | HEART RATE: 67 BPM | SYSTOLIC BLOOD PRESSURE: 129 MMHG | BODY MASS INDEX: 31.51 KG/M2

## 2019-06-07 DIAGNOSIS — Z95.2 HISTORY OF MITRAL VALVE REPLACEMENT WITH MECHANICAL VALVE: ICD-10-CM

## 2019-06-07 LAB — INTERNATIONAL NORMALIZATION RATIO, POC: 2.6

## 2019-06-07 PROCEDURE — 99211 OFF/OP EST MAY X REQ PHY/QHP: CPT

## 2019-06-07 PROCEDURE — 85610 PROTHROMBIN TIME: CPT

## 2019-06-07 RX ORDER — OXYCODONE AND ACETAMINOPHEN 10; 325 MG/1; MG/1
1 TABLET ORAL EVERY 6 HOURS PRN
COMMUNITY

## 2019-06-07 NOTE — PROGRESS NOTES
[address] [facility]    Patient Findings     Positives:   Change in health (RED LEG IS SWOLLEN AND RED, HAD US YESTERDAY (NEGATIVE), WAITING TO HEAR BACK FROM PCP OFFICE, DAUGHTER SUSPECTS CELLULITIS), Change in medications (OFF NORCO, NOW ON PERCOCET 4 TIMES A DAY)    Negatives:   Signs/symptoms of thrombosis, Signs/symptoms of bleeding, Laboratory test error suspected, Change in alcohol use, Change in activity, Upcoming invasive procedure, Emergency department visit, Upcoming dental procedure, Missed doses, Extra doses, Change in diet/appetite, Hospital admission, Bruising, Other complaints    Comments:   June 17 - DR. REDDY         Patient  reports that he has quit smoking. He has never used smokeless tobacco.     Assessment/Plan:  Warfarin indication: mechanical MVR and atrial fibrillation          INR today is therapeutic at 2.6, goal is 2.5-3.5. Warfarin Dose: same (2 mg every Mon./Fri.; 3 mg all other days)    Follow Up: 3 weeks    Patient understands dosing directions and information discussed. Dosing schedule and follow up appointment given to patient. Patient acknowledges working in consult agreement with pharmacist as referred by his/her physician.     Donavan Scruggs PharmD 6/7/2019 1:08 PM

## 2019-06-17 ENCOUNTER — OFFICE VISIT (OUTPATIENT)
Dept: CARDIOLOGY CLINIC | Age: 84
End: 2019-06-17
Payer: MEDICARE

## 2019-06-17 VITALS
WEIGHT: 218.3 LBS | DIASTOLIC BLOOD PRESSURE: 78 MMHG | HEIGHT: 70 IN | BODY MASS INDEX: 31.25 KG/M2 | HEART RATE: 67 BPM | RESPIRATION RATE: 16 BRPM | SYSTOLIC BLOOD PRESSURE: 120 MMHG

## 2019-06-17 DIAGNOSIS — Z95.2 S/P MVR (MITRAL VALVE REPLACEMENT): Primary | ICD-10-CM

## 2019-06-17 DIAGNOSIS — Z79.01 CHRONIC ANTICOAGULATION: ICD-10-CM

## 2019-06-17 DIAGNOSIS — Z95.2 S/P TAVR (TRANSCATHETER AORTIC VALVE REPLACEMENT): ICD-10-CM

## 2019-06-17 DIAGNOSIS — I48.21 PERMANENT ATRIAL FIBRILLATION (HCC): ICD-10-CM

## 2019-06-17 PROCEDURE — 1036F TOBACCO NON-USER: CPT | Performed by: INTERNAL MEDICINE

## 2019-06-17 PROCEDURE — 4040F PNEUMOC VAC/ADMIN/RCVD: CPT | Performed by: INTERNAL MEDICINE

## 2019-06-17 PROCEDURE — G8417 CALC BMI ABV UP PARAM F/U: HCPCS | Performed by: INTERNAL MEDICINE

## 2019-06-17 PROCEDURE — 93000 ELECTROCARDIOGRAM COMPLETE: CPT | Performed by: INTERNAL MEDICINE

## 2019-06-17 PROCEDURE — 1123F ACP DISCUSS/DSCN MKR DOCD: CPT | Performed by: INTERNAL MEDICINE

## 2019-06-17 PROCEDURE — G8427 DOCREV CUR MEDS BY ELIG CLIN: HCPCS | Performed by: INTERNAL MEDICINE

## 2019-06-17 PROCEDURE — 99213 OFFICE O/P EST LOW 20 MIN: CPT | Performed by: INTERNAL MEDICINE

## 2019-06-17 NOTE — PROGRESS NOTES
Patient Active Problem List   Diagnosis    Mitral valve regurgitation    S/P mitral valve replacement - [facility] valve at [facility]    Systemic primary arterial hypertension    Nonrheumatic aortic valve stenosis    Aortic valve insufficiency - moderate to severe     Chronic anticoagulation    S/P MVR (mitral valve replacement) - [facility]    Epidural hematoma (HCC)    Degenerative disc disease, lumbar    Hematuria    S/P TAVR (transcatheter aortic valve replacement)    History of mitral valve replacement with mechanical valve    Permanent atrial fibrillation (HCC)       Current Outpatient Medications   Medication Sig Dispense Refill    oxyCODONE-acetaminophen (PERCOCET) 5-325 MG per tablet Take 1 tablet by mouth 4 times daily.  Dextromethorphan-guaiFENesin  MG/5ML SYRP Take 5 mLs by mouth every 4 hours as needed for Cough      aluminum & magnesium hydroxide-simethicone (MAALOX) 200-200-20 MG/5ML SUSP suspension Take 5 mLs by mouth every 6 hours as needed for Indigestion      bisacodyl (DULCOLAX) 10 MG suppository Place 10 mg rectally daily      PREDNISONE PO Take 8 mg by mouth See Admin Instructions TAKING THREE 1MG TABS + 5 MG TO MAKE 8 MG (ONCE DAILY)      olmesartan (BENICAR) 40 MG tablet Take 40 mg by mouth daily      magnesium hydroxide (MILK OF MAGNESIA) 400 MG/5ML suspension Take by mouth daily as needed for Constipation      torsemide (DEMADEX) 20 MG tablet Take 20 mg by mouth daily      warfarin (COUMADIN) 2 MG tablet Take as directed by [address]. See encounters for most recent dosing history. MENDEZ COUMADIN      amLODIPine (NORVASC) 10 MG tablet Take 10 mg by mouth daily      metoprolol succinate (TOPROL XL) 25 MG extended release tablet Take 25 mg by mouth 2 times daily Take morning of surgery with a sip of water      Multiple Vitamins-Minerals (CERTAGEN SILVER PO) Take  by mouth daily.        No current facility-administered medications for this visit. CC:    Patient is seen in follow up for:  1. S/P MVR (mitral valve replacement) - [facility]    2. S/P TAVR (transcatheter aortic valve replacement)    3. Chronic anticoagulation    4. Permanent atrial fibrillation (HCC)        HPI:  No chest pain, SOB, lightheadedness or dizziness. Patient is tolerating medications well without side effects. Hx per patient and daughter.       ROS:   General: No unusual weight loss, decreased exercise tolerance  EENT: No vision changes or nosebleeds  Cardiovascular: No orthopnea or paroxysmal nocturnal dyspnea  Respiratory: No cough or hemoptysis  Gastrointestinal: No hematemesis or recent changes in bowel habits  Genitourinary: No hematuria, urgency or frequency  Allergic / Immunologic/ Lymphatic / Endocrine: No anemia or bleeding tendency       Social History     Socioeconomic History    Marital status:      Spouse name: Not on file    Number of children: Not on file    Years of education: Not on file    Highest education level: Not on file   Occupational History    Not on file   Social Needs    Financial resource strain: Not on file    Food insecurity:     Worry: Not on file     Inability: Not on file    Transportation needs:     Medical: Not on file     Non-medical: Not on file   Tobacco Use    Smoking status: Former Smoker    Smokeless tobacco: Never Used    Tobacco comment: Quit 50 yrs   Substance and Sexual Activity    Alcohol use: No    Drug use: No    Sexual activity: Not on file   Lifestyle    Physical activity:     Days per week: Not on file     Minutes per session: Not on file    Stress: Not on file   Relationships    Social connections:     Talks on phone: Not on file     Gets together: Not on file     Attends [employer] service: Not on file     Active member of club or organization: Not on file     Attends meetings of clubs or organizations: Not on file     Relationship status: Not on file    Intimate

## 2019-06-24 ENCOUNTER — HOSPITAL ENCOUNTER (OUTPATIENT)
Dept: PHARMACY | Age: 84
Setting detail: THERAPIES SERIES
Discharge: HOME | End: 2019-06-24
Payer: MEDICARE

## 2019-06-24 VITALS
HEART RATE: 74 BPM | WEIGHT: 219 LBS | SYSTOLIC BLOOD PRESSURE: 111 MMHG | DIASTOLIC BLOOD PRESSURE: 61 MMHG | BODY MASS INDEX: 31.42 KG/M2

## 2019-06-24 DIAGNOSIS — Z95.2 HISTORY OF MITRAL VALVE REPLACEMENT WITH MECHANICAL VALVE: ICD-10-CM

## 2019-06-24 LAB — INTERNATIONAL NORMALIZATION RATIO, POC: 2.1

## 2019-06-24 PROCEDURE — 99211 OFF/OP EST MAY X REQ PHY/QHP: CPT

## 2019-06-24 PROCEDURE — 85610 PROTHROMBIN TIME: CPT

## 2019-06-24 NOTE — PROGRESS NOTES
[address] [facility]    Patient Findings     Positives:   Change in medications (RECENTLY FINISHED DOXYCYCLINE FOR CELLULITIS ON HIS LEG), Bruising (BRUISING ON LEFT ARM FROM DOG JUMPING ON HIM)    Negatives:   Signs/symptoms of thrombosis, Signs/symptoms of bleeding, Laboratory test error suspected, Change in health, Change in alcohol use, Change in activity, Upcoming invasive procedure, Emergency department visit, Upcoming dental procedure, Missed doses, Extra doses, Change in diet/appetite, Hospital admission, Other complaints    Comments:   DR. JJ NEXT WEEK         Patient  reports that he has quit smoking. He has never used smokeless tobacco.     Assessment/Plan:  Warfarin indication: mechanical MVR and atrial fibrillation            INR today is SUBtherapeutic at 2.1, goal is 2.5-3.5. NO KNOWN CAUSE. Warfarin Dose: INCREASE BY 10.5% TO 3 MG DAILY    Follow Up: 1.5 weeks    Patient understands dosing directions and information discussed. Dosing schedule and follow up appointment given to patient. Patient acknowledges working in consult agreement with pharmacist as referred by his/her physician.     Rachel Cooney PharmD 6/24/2019 11:32 AM

## 2019-07-05 ENCOUNTER — HOSPITAL ENCOUNTER (OUTPATIENT)
Dept: PHARMACY | Age: 84
Setting detail: THERAPIES SERIES
Discharge: HOME | End: 2019-07-05
Payer: MEDICARE

## 2019-07-05 VITALS
SYSTOLIC BLOOD PRESSURE: 156 MMHG | WEIGHT: 221 LBS | DIASTOLIC BLOOD PRESSURE: 78 MMHG | BODY MASS INDEX: 31.71 KG/M2 | HEART RATE: 72 BPM

## 2019-07-05 DIAGNOSIS — Z95.2 HISTORY OF MITRAL VALVE REPLACEMENT WITH MECHANICAL VALVE: ICD-10-CM

## 2019-07-05 LAB — INTERNATIONAL NORMALIZATION RATIO, POC: 3.1

## 2019-07-05 PROCEDURE — 85610 PROTHROMBIN TIME: CPT

## 2019-07-05 PROCEDURE — 99211 OFF/OP EST MAY X REQ PHY/QHP: CPT

## 2019-07-05 NOTE — PROGRESS NOTES
[facility]    Patient Findings     Positives:   Change in medications (RECEIVED A STEROID INJECTION LAST WEEK)    Negatives:   Signs/symptoms of thrombosis, Signs/symptoms of bleeding, Laboratory test error suspected, Change in health, Change in alcohol use, Change in activity, Upcoming invasive procedure, Emergency department visit, Upcoming dental procedure, Missed doses, Extra doses, Change in diet/appetite, Hospital admission, Bruising, Other complaints         Patient  reports that he has quit smoking. He has never used smokeless tobacco.     Assessment/Plan:  Warfarin indication:   mechanical MVR and atrial fibrillation            INR today is therapeutic at 3.1, goal is 2.5-3.5. Warfarin Dose: same (3 mg daily)    Follow Up: 2 weeks    Patient understands dosing directions and information discussed. Dosing schedule and follow up appointment given to patient. Patient acknowledges working in consult agreement with pharmacist as referred by his/her physician.     Ned Wilson PharmD 7/5/2019 10:08 AM

## 2019-08-01 NOTE — PROGRESS NOTES
[facility]    Patient Findings     Positives:   Change in medications (PATIENT WILL FINISH Shane Wilberto (CELLULITIS))    Negatives:   Signs/symptoms of thrombosis, Signs/symptoms of bleeding, Laboratory test error suspected, Change in health, Change in alcohol use, Change in activity, Upcoming invasive procedure, Emergency department visit, Upcoming dental procedure, Missed doses, Extra doses, Change in diet/appetite, Hospital admission, Bruising, Other complaints         Patient  reports that he has quit smoking. He has never used smokeless tobacco.     Assessment/Plan:  Warfarin indication: mechanical MVR and atrial fibrillation              INR today is therapeutic at 2.9, goal is 2.5-3.5. Warfarin Dose: same (4 mg every Fri.; 3 mg all other days)    Follow Up: 2 weeks    Patient understands dosing directions and information discussed. Dosing schedule and follow up appointment given to patient. Patient acknowledges working in consult agreement with pharmacist as referred by his/her physician.     Valentine Jc PharmD 8/1/2019 11:11 AM

## 2019-08-16 NOTE — ED PROVIDER NOTES
[facility]), Difficult intubation, [place] (hard of hearing), Hyperlipidemia, Hypertension, Mitral stenosis with regurgitation, Osteoarthritis, and Use of cane as ambulatory aid. 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 Social History:  reports that he has quit smoking. He has never used smokeless tobacco. He reports that he does not drink alcohol or use drugs. Family History: family history is not on file. The patients home medications have been reviewed. Allergies: Patient has no known allergies.     -------------------------------------------------- RESULTS -------------------------------------------------  Labs:  Results for orders placed or performed during the hospital encounter of 08/16/19   CBC auto differential   Result Value Ref Range    WBC 6.4 4.5 - 11.5 E9/L    RBC 4.04 3.80 - 5.80 E12/L    Hemoglobin 12.2 (L) 12.5 - 16.5 g/dL    Hematocrit 38.9 37.0 - 54.0 %    MCV 96.3 80.0 - 99.9 fL    MCH 30.2 26.0 - 35.0 pg    MCHC 31.4 (L) 32.0 - 34.5 %    RDW 14.6 11.5 - 15.0 fL    Platelets 739 089 - 438 E9/L    MPV 9.7 7.0 - 12.0 fL    Neutrophils % 78.6 43.0 - 80.0 %    Immature Granulocytes % 0.3 0.0 - 5.0 %    Lymphocytes % 9.1 (L) 20.0 - 42.0 %    Monocytes % 10.0 2.0 - 12.0 %    Eosinophils % 1.4 0.0 - 6.0 %    Basophils % 0.6 0.0 - 2.0 %    Neutrophils # 5.03 1.80 - 7.30 E9/L    Immature Granulocytes # 0.02 E9/L    Lymphocytes # 0.58 (L) 1.50 - 4.00 E9/L    Monocytes # 0.64 0.10 - 0.95 E9/L    Eosinophils # 0.09 0.05 - 0.50 E9/L    Basophils # 0.04 0.00 - 0.20 E9/L    Poikilocytes 1+     Ovalocytes 1+    Basic Metabolic Panel w/ Reflex to MG   Result Value Ref Range    Sodium 139 132 - 146 mmol/L    Potassium reflex Magnesium 5.1 (H) 3.5 - 5.0 mmol/L Chloride 102 98 - 107 mmol/L    CO2 28 22 - 29 mmol/L    Anion Gap 9 7 - 16 mmol/L    Glucose 104 (H) 74 - 99 mg/dL    BUN 23 8 - 23 mg/dL    CREATININE 1.0 0.7 - 1.2 mg/dL    GFR Non-African American >60 >=60 mL/min/1.73    GFR African American >60     Calcium 9.2 8.6 - 10.2 mg/dL   Protime-INR   Result Value Ref Range    Protime 31.3 (H) 9.3 - 12.4 sec    INR 2.8        Radiology:  No orders to display       ------------------------- NURSING NOTES AND VITALS REVIEWED ---------------------------  Date / Time Roomed:  8/16/2019  6:35 AM  ED Bed Assignment:  17/17    The nursing notes within the ED encounter and vital signs as below have been reviewed. BP (!) 145/73   Pulse 67   Temp 97.5 °F (36.4 °C) (Axillary)   Resp 18   Ht 5' 10\" (1.778 m)   Wt 215 lb (97.5 kg)   SpO2 97%   BMI 30.85 kg/m²   Oxygen Saturation Interpretation: Normal      ------------------------------------------ PROGRESS NOTES ------------------------------------------  9:46 AM  I have spoken with the patient and discussed todays results, in addition to providing specific details for the plan of care and counseling regarding the diagnosis and prognosis. Their questions are answered at this time and they are agreeable with the plan. I discussed at length with them reasons for immediate return here for re evaluation. They will followup with their and the on call primary care physician, general surgeon and orthopedic physician by calling their office tomorrow and on Monday.      --------------------------------- ADDITIONAL PROVIDER NOTES ---------------------------------  At this time the patient is without objective evidence of an acute process requiring hospitalization or inpatient management. They have remained hemodynamically stable throughout their entire ED visit and are stable for discharge with outpatient follow-up.      The plan has been discussed in detail and they are aware of the specific conditions for emergent return, as

## 2019-08-16 NOTE — TELEPHONE ENCOUNTER
Mary Reagan left message on VM. Cancelled appt for today since patient is currently @ [facility] ER. Patient had dental work yesterday and had bleeding problems throughout the night. Stated INR = 2.8 this morning. Please advise of any warfarin dosing changes and when to reschedule. Thank you!     Electronically signed by Irvin Rubio on 8/16/19 at 9:34 AM

## 2019-08-17 NOTE — ED NOTES
This rn has attempted to control bleeding with TXA soaked gauze x 2 with md. Unsuccessful. Blood suctioned out of oral cavity to provide clarity of wound bed and location. Bleeding source found to be behind front two lower teeth.

## 2019-08-17 NOTE — ED PROVIDER NOTES
The patient is a 25-year-old male presenting with bleeding from his gums. Patient has a history of mitral valve regurg, A. fib, anticoagulated on Coumadin. Patient states that he had dental work performed 2 days ago and was told to stay on his Coumadin due to having  a mechanical valve. Patient states he was here yesterday and was given TXA and the bleeding was stopped. He states he has been bleeding since 3 AM.  He is only tried pressure with some relief. He states more than a couple blood test came out. Vital signs are stable at this time. Review of Systems   Constitutional: Negative for chills and fever. HENT: Positive for dental problem. Negative for ear discharge, ear pain, rhinorrhea, sinus pain and sore throat. Bleeding from gums   Eyes: Negative for visual disturbance. Respiratory: Negative for cough, chest tightness and shortness of breath. Cardiovascular: Negative for chest pain, palpitations and leg swelling. Gastrointestinal: Negative for abdominal pain, constipation, diarrhea and nausea. Endocrine: Negative for polyuria. Genitourinary: Negative for dysuria, enuresis, frequency and hematuria. Musculoskeletal: Negative for arthralgias and neck pain. Skin: Negative for rash. Neurological: Negative for weakness, light-headedness and headaches. Psychiatric/Behavioral: Negative. Physical Exam   Constitutional: He is oriented to person, place, and time. He appears well-developed and well-nourished. No distress. HENT:   Head: Normocephalic and atraumatic. Mouth/Throat:       Eyes: Pupils are equal, round, and reactive to light. Conjunctivae and EOM are normal.   Neck: Normal range of motion. No JVD present. No thyromegaly present. Cardiovascular: Normal rate, regular rhythm and intact distal pulses. Murmur heard. Pulmonary/Chest: Effort normal and breath sounds normal. No respiratory distress. He has no wheezes. He exhibits no tenderness. objective evidence of an acute process requiring hospitalization or inpatient management. They have remained hemodynamically stable throughout their entire ED visit and are stable for discharge with outpatient follow-up. The plan has been discussed in detail and they are aware of the specific conditions for emergent return, as well as the importance of follow-up. New Prescriptions    No medications on file       Diagnosis:  1. Bleeding gums    2. Chronic anticoagulation        Disposition:  Patient's disposition: Discharge  Patient's condition is stable.               Sanya Bynum DO  Resident  08/17/19 1922

## 2019-08-18 NOTE — ED PROVIDER NOTES
ED Attending  CC: Talia     Department of Emergency Medicine   ED  Provider Note  Admit Date/RoomTime: 8/18/2019 11:42 AM  ED Room: 35/35   Chief Complaint   Coagulation Disorder (had teeth extracted seen at [facility] 2 times still bleeding dentist wanted pt sent to ED today)    History of Present Illness   Source of history provided by:  patient. History/Exam Limitations: none. Agusto Garcia is a 80 y.o. old male who has a past medical history of:   Past Medical History:   Diagnosis Date    Arrhythmia atrial fibrillation    Cancer ([address]     skin nose    Difficult intubation 04/11/2007    tube size 7.5    [place] (hard of hearing)     Hyperlipidemia     Hypertension     Mitral stenosis with regurgitation     Osteoarthritis     Use of cane as ambulatory aid     presents to the emergency department by private vehicle, for bottom bleeding and pain, which occured 3 day(s) prior to arrival.  Since onset the symptoms have been gradually worsening and mild-moderate in severity. Worsened by  chewing and improved by nothing. Associated Signs & Symptoms:  no other symptoms and Facial pain. Patient had a bottom tooth extracted done on Friday per Dr. Kenzie Saucedo. He currently is on coumadin but did stop it prior to his surgery. He was seen in the ED yesterday for uncontrolled bleeding as well. He present for he same complaints today. His dentist did see him in the assisted living facility he resides in but could not stop the bleeding. His dentist is the one who sent him to the ED to be evaluated. Onset:       Spontaneous:   yes. Following Trauma:   no.     Previous Caries:   no.     Recent Dental Procedure:   yes. ROS    Pertinent positives and negatives are stated within HPI, all other systems reviewed and are negative. .    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 Social History:  reports that he has quit smoking. He has never used smokeless tobacco. He reports that he does not drink alcohol or use drugs. Family History: family history is not on file. Allergies: Patient has no known allergies. Physical Exam           ED Triage Vitals [08/18/19 1139]   BP Temp Temp src Pulse Resp SpO2 Height Weight   111/66 97.9 °F (36.6 °C) -- 89 18 99 % 5' 10\" (1.778 m) 215 lb (97.5 kg)      Oxygen Saturation Interpretation: Normal.    · Constitutional:  Alert, development consistent with age. · HEENT:  NC/NT. Airway patent. · Neck:  Supple. Normal ROM. · Lips:  upper and lower normal.  · Mouth:  normal tongue and buccal mucosa. · Dental:  Active bleeding noted around teeth 24 and 25. Trismus: No.         Drooling: No.           Airway stridor: No.  · Facial skin: bilateral no erythema, rash or swelling noted. · Respiratory:  Clear to auscultation and breath sounds equal.    · CV: Regular rate and rhythm, normal heart sounds, without pathological murmurs, ectopy, gallops, or rubs. · Skin:  No rashes, erythema or lesions present, unless noted elsewhere. .  · Lymphatics: No lymphangitis or adenopathy noted. · Neurological:  Oriented. Motor functions intact.     Lab / Imaging Results   (All laboratory and radiology results have been personally reviewed by myself)  Labs:  Results for orders placed or performed during the hospital encounter of 08/18/19   CBC Auto Differential   Result Value Ref Range    WBC 8.0 4.5 - 11.5 E9/L    RBC 3.80 3.80 - 5.80 E12/L    Hemoglobin 11.3 (L) 12.5 - 16.5 g/dL    Hematocrit 36.0 (L) 37.0 - 54.0 %    MCV 94.7 80.0 - 99.9 fL    MCH 29.7 26.0 - 35.0 pg    MCHC 31.4 (L) 32.0 - 34.5 %    RDW 14.4 11.5 - 15.0 fL    Platelets 176 950 - 182 E9/L    MPV 9.6 7.0 - 12.0 fL    Neutrophils % 82.0 (H) 43.0 - 80.0 %    Immature 11.3, and a INR of  4.3. Patient was given one liter of normal saline while in the ED. His chest xray showed patchy bibasilar opacities with differentials including atelectasis, scarring and infection. I did consult the dental residents at this time. Jero Saucedo was at bedside and did end up removing the patients bottom bridge and placed 4 sutures on his bottom gumline. His bleeding did stop. I consulted his primary care provider Mercedez Pettit who instructed to have him withhold his coumadin for two days and go to the office on Tuesday to have his coumadin level rechecked. He will be discharged at this time and is to go to appointment on Tues. I did inform him if any of his symptoms worsened he needed to return to ED immediately. Assessment      1. Bleeding gums    2. Chronic anticoagulation      Plan   Discharge to home  Patient condition is good    New Medications     Discharge Medication List as of 8/18/2019  2:39 PM        Electronically signed by DORIE Correia NP   DD: 8/18/19  **This report was transcribed using voice recognition software. Every effort was made to ensure accuracy; however, inadvertent computerized transcription errors may be present.   END OF ED PROVIDER NOTE      DORIE Sharma NP  08/21/19 [id number]

## 2019-08-19 PROBLEM — J18.9 PNEUMONIA: Status: ACTIVE | Noted: 2019-01-01

## 2019-08-19 NOTE — ED PROVIDER NOTES
return repair (10/2017). Social History:  reports that he has quit smoking. He has never used smokeless tobacco. He reports that he does not drink alcohol or use drugs. Family History: family history is not on file. The patients home medications have been reviewed. Allergies: Patient has no known allergies. ----------------------------------------------PHYSICAL EXAM--------------------------------------    Constitutional/General: Alert and oriented x3, well appearing, non toxic in NAD  Head: Normocephalic and atraumatic  Eyes: PERRL, EOMI, conjunctiva normal, sclera non icteric  Mouth: Oropharynx clear, handling secretions, no trismus. Sutures in tact. Neck: Supple, no JVD, full ROM  Respiratory: Rhonchi noted to the bilateral upper lobes but not in the bases, no wheezes or rales. Not in respiratory distress  Cardiovascular:  Regular rate. Regular rhythm. No murmurs, gallops, or rubs. 2+ distal pulses  Chest: No chest wall tenderness  GI:  Abdomen Soft, Non tender, Non distended. +BS. No rebound, guarding, or rigidity. No pulsatile masses. Musculoskeletal: Moves all extremities x 4. Warm and well perfused, no clubbing, cyanosis, or pitting edema. Integument: Skin warm and dry. Neurologic: GCS 15, CN II-XII grossly intact, no focal deficits,  Psychiatric: Normal Affect    -------------------------------------------------- RESULTS -------------------------------------------------  I have personally reviewed all laboratory and imaging results for this patient. Results are listed below.      LABS:  Results for orders placed or performed during the hospital encounter of 08/19/19   CBC Auto Differential   Result Value Ref Range    WBC 17.0 (H) 4.5 - 11.5 E9/L    RBC 3.52 (L) 3.80 - 5.80 E12/L    Hemoglobin 10.9 (L) 12.5 - 16.5 g/dL    Hematocrit 32.9 (L) 37.0 - 54.0 %    MCV 93.5 80.0 - 99.9 fL    MCH 31.0 26.0 - 35.0 pg    MCHC 33.1 32.0 - 34.5 %    RDW 14.7 11.5 - 15.0 fL    Platelets 082 (L) 127 - Mode NC-5  L     Date Of Collection      Time Collected      Pt Temp 37.0 C     ID [id number]     Lab [id number]     Critical(s) Notified . No Critical Values    EKG 12 Lead   Result Value Ref Range    Ventricular Rate 82 BPM    Atrial Rate 61 BPM    QRS Duration 112 ms    Q-T Interval 412 ms    QTc Calculation (Bazett) 481 ms    R Axis 63 degrees    T Axis 67 degrees       RADIOLOGY:  Interpreted by Radiologist.  XR CHEST PORTABLE   Final Result   Findings suggest early congestive heart failure superimposed upon   COPD. Developing right basilar pneumonia must additionally be   considered. Laboratory correlation is suggested. Gravitational shift   test can be performed portably at the patient's bedside if needed for   further radiographic differentiation. EKG:  This EKG is signed and interpreted by the EP. Time: 19:11  Rate: 82  Rhythm: Atrial fibrillation  Comparison: Compared to prior    ------------------------- NURSING NOTES AND VITALS REVIEWED ---------------------------   The nursing notes within the ED encounter and vital signs as below have been reviewed by myself. BP (!) 101/58   Pulse 83   Temp 99.2 °F (37.3 °C) (Oral)   Resp 18   Ht 5' 10\" (1.778 m)   Wt 215 lb (97.5 kg)   SpO2 98%   BMI 30.85 kg/m²   Oxygen Saturation Interpretation: Abnormal    The patients available past medical records and past encounters were reviewed.       ------------------------- ED COURSE/MEDICAL DECISION MAKING----------------------  Medications   0.9 % sodium chloride bolus (500 mLs Intravenous New Bag 8/19/19 1950)   ampicillin-sulbactam (UNASYN) 3 g ivpb minibag (has no administration in time range)   sodium chloride flush 0.9 % injection 10 mL (has no administration in time range)   sodium chloride flush 0.9 % injection 10 mL (has no administration in time range)   acetaminophen (TYLENOL) tablet 650 mg (has no administration in time range)       Medical Decision Making:   Pt is a 79 y/o male who presents for shortness of breath and generalized weakness beginning today. He was seen multiple times in the ED for bleeding gums secondary to oral surgery. No bleeding ever since he got stitches placed by his dentist. He is on coumadin which he hasn't taken for the past 2 days and was told to not take it untill tomorrow. Pt returns to the ED today after growing increasingly weak and short of breath at the NF while attempting to use the bathroom. He initially presented 77% on room air and when given 6 liters via nasal canula his saturation was 92-94%. On physical exam he has rhonchi in the bilateral upper lobes but not in the bases and does not have any pitting edema. Unasyn and IV fluids were ordered. Labs and imaging obtained, reviewed; there is suspicions for CHF and probable right sided pneumonia on chest XR. INR is elevated at 5.7 and WBC is 17. Results discussed with patient. Will continue to hold coumadin. Consult placed to Dr. Mc Castro and he is agreeable with admitting pt. He's agreeable with the unasyn and holding lasix as the patient is borderline hypotensive. He's also agreeable with holding coumadin and vitamin K and monitoring INR. Pt is a DNR-CC. Will admit to telemetry. This patient's ED course included: a personal history and physicial examination    This patient has remained hemodynamically stable during their ED course. Re-Evaluations:           Re-evaluation. Patients symptoms show no change       Consultations:             Time: 8:47 PM.   Spoke with Dr. Mc Castro (Medicine). Discussed case. They will admit this patient. Counseling: The emergency provider has spoken with the patient and discussed todays results, in addition to providing specific details for the plan of care and counseling regarding the diagnosis and prognosis.  Questions are answered at this time and they are agreeable with the plan.     ---------------------------- IMPRESSION AND DISPOSITION

## 2019-08-19 NOTE — ED NOTES
Bed: 26  Expected date:   Expected time:   Means of arrival:   Comments:  Severo Pickett, RN  08/19/19 9606

## 2019-08-19 NOTE — ED NOTES
Patient returns here for the third visit, this rn was the primary rn when patient was here on Saturday 8/17/2019. Patient was present on Saturday for bleeding control for lower mouth tooth extraction and inability to control bleeding. Patients daughter was present with him for his stay. This rn questioned patient lung sounds during stay and his wetness and daughter who is a nurse stated that her fathers lungs always sounded like that and he was just here for bleeding control. Patient was on no oxygen on Saturday and was 92% on room air during attempts at bleeding control. Patient returns today after growing increasingly weak at the snf while attempting to use the bathroom patient grew weak and diaphoretic. Patient presented 77% on room air. Currently on 6lnc patient 92-94%    Lung sounds sound the same as per discharge on Saturday +crackles with a moist cough noted patient has poor inspiratory effort.  There si swelling to the ble which per daughter on Saturday was normal. daughter is not present here today however son is present at bedside      Chris Bennett RN  08/19/19 7615

## 2019-08-19 NOTE — ED NOTES
Critical INR of 5.6 called and reported to this RN. Doctor aware.      Akua Dhillon, LEIGH  08/19/19 9874

## 2019-08-20 PROBLEM — J69.0 ASPIRATION PNEUMONIA (HCC): Status: ACTIVE | Noted: 2019-01-01

## 2019-08-20 NOTE — H&P
daily. Dextromethorphan-guaiFENesin  MG/5ML SYRP, Take 5 mLs by mouth every 4 hours as needed for Cough    Allergies:    Patient has no known allergies. Social History:   Social History     Socioeconomic History    Marital status:      Spouse name: Not on file    Number of children: Not on file    Years of education: Not on file    Highest education level: Not on file   Occupational History    Not on file   Social Needs    Financial resource strain: Not on file    Food insecurity:     Worry: Not on file     Inability: Not on file    Transportation needs:     Medical: Not on file     Non-medical: Not on file   Tobacco Use    Smoking status: Former Smoker    Smokeless tobacco: Never Used    Tobacco comment: Quit 50 yrs   Substance and Sexual Activity    Alcohol use: No    Drug use: No    Sexual activity: Not on file   Lifestyle    Physical activity:     Days per week: Not on file     Minutes per session: Not on file    Stress: Not on file   Relationships    Social connections:     Talks on phone: Not on file     Gets together: Not on file     Attends Buddhist service: Not on file     Active member of club or organization: Not on file     Attends meetings of clubs or organizations: Not on file     Relationship status: Not on file    Intimate partner violence:     Fear of current or ex partner: Not on file     Emotionally abused: Not on file     Physically abused: Not on file     Forced sexual activity: Not on file   Other Topics Concern    Not on file   Social History Narrative    Not on file       Family History:   No family history on file.     REVIEW OF SYSTEMS:  Constitutional: Fever [-]  Abnormal Weight loss [-]   Eyes: Visual impairment with glasses  [-]   Ears, nose, mouth, throat, and face: Hearing loss [-] Dry mucous membranes dry [-]    Respiratory: SOB [-] Cough [-] Productive [-] dry [-]   Cardiovascular: Chest pain [-] Palpitations [-] Near Syncope [-] Dizziness [-] FINDINGS: Mild cardiomegaly is again noted and is stable. Changes of remote mitral valve replacement are again seen. Metallic sternal sutures and epicardial pacer wires remain. Superimposed upon evidence of COPD is mild to moderate pulmonary vascular congestion. Some ill-definition of interstitial pulmonic markings is now seen, suggestive of early interstitial edema and this is more pronounced on the right side, especially in the right lower hemithorax. Subsegmental atelectasis is noted in the left lung base, some of which may represent scarring as this appears to be chronic. Findings suggest early congestive heart failure superimposed upon COPD. Developing right basilar pneumonia must additionally be considered. Laboratory correlation is suggested. Gravitational shift test can be performed portably at the patient's bedside if needed for further radiographic differentiation. ASSESSMENT:      Patient Active Problem List   Diagnosis Code    Mitral valve regurgitation I34.0    S/P mitral valve replacement - [address] #31 valve at [facility] [place] 12/17/91 I34.0    Atrial fibrillation (HCC) I48.91    Systemic primary arterial hypertension I10    Nonrheumatic aortic valve stenosis I35.0    Aortic valve insufficiency - moderate to severe  I35.1    Chronic anticoagulation Z79.01    S/P MVR (mitral valve replacement) - [address] #31 Z95.2    Epidural hematoma ([address] S06. 4X9A    Degenerative disc disease, lumbar M51.36    Hematuria R31.9    S/P TAVR (transcatheter aortic valve replacement) Z95.2    History of mitral valve replacement with mechanical valve Z95.2    Permanent atrial fibrillation (HCC) I48.2    Aspiration pneumonia (HCC) J69.0       PLAN:     Hold Coumadin   Continue IV Unasyn for aspiration pneumonia, WBC improving. I suspect patient with probable aspiration pneumonia due to the fact that he had a few day history of oral bleeding. Chest PT every 4 hours. Duo nebs every 4 hours.   Mucinex every 4 hours.  Encourage cough and deep breathing, aggressive pulmonary toilet  Continue to wean nasal cannula O2 as tolerated. Monitor labs    Review the  old chart , Case discussed  with other consultants and Dr. Derrick Garcia as required.       Electronically signed by DORIE Wahl CNP on 8/20/2019 at 4:57 PM

## 2019-08-20 NOTE — ED NOTES
SBAR faxed to [facility] Barreto for confirmation of receipt.      Angélica Valencia RN  08/19/19 9686

## 2019-08-20 NOTE — PROGRESS NOTES
Physical Therapy    Facility/Department: [facility] MED SURG  Initial Assessment    NAME: Bijal Rodriguez  : 1927  MRN: [id number]    Date of Service: 2019       REQUIRES PT FOLLOW UP: Yes       Patient Diagnosis(es): The primary encounter diagnosis was Congestive heart failure, unspecified HF chronicity, unspecified heart failure type ([facility] [address] Diagnoses of Pneumonia due to organism and Abnormal INR were also pertinent to this visit. has a past medical history of Arrhythmia, Cancer ([address] Difficult intubation, [place] (hard of hearing), Hyperlipidemia, Hypertension, Mitral stenosis with regurgitation, Osteoarthritis, and Use of cane as ambulatory aid. 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 Evaluating Therapist: David Steen PT      Room #:  0   DIAGNOSIS: pneumonia   PRECAUTIONS: falls, O2     Social:  Pt lives at [facility]   Prior to admission pt walked with rollator      Initial Evaluation  Date:  19 Treatment      Short Term/ Long Term   Goals   Was pt agreeable to Eval/treatment? yes      Does pt have pain?  none reported      Bed Mobility  Rolling:  NT   Supine to sit:  SBA   Sit to supine:  NT   Scooting:  SBA    independent    Transfers Sit to stand:  CGA   Stand to sit:  CGA   Stand pivot:  CGA    independent    Ambulation     40  feet with  ww  with  CGA    150  feet with  ww  with  Independent    LE ROM  WFL      LE strength  4- to 4/ 5      AM- PAC RAW score   17/ 24            Pt is alert and Oriented, questionable historian      Balance: CGA, mildly unsteady , no LOB, fall risk   Endurance: decreased   Chair alarm: Yes      ASSESSMENT  Pt displays functional ability as noted in the objective portion of this evaluation.       Comments/Treatment:   Pulse ox with O2

## 2019-08-20 NOTE — PLAN OF CARE
Problem: Falls - Risk of:  Goal: Will remain free from falls  Description  Will remain free from falls  Outcome: Ongoing  Goal: Absence of physical injury  Description  Absence of physical injury  Outcome: Ongoing     Problem: Gas Exchange - Impaired:  Goal: Levels of oxygenation will improve  Description  Levels of oxygenation will improve  Outcome: Ongoing     Problem: Bleeding:  Goal: Will show no signs and symptoms of excessive bleeding  Description  Will show no signs and symptoms of excessive bleeding  Outcome: Ongoing

## 2019-08-20 NOTE — PROGRESS NOTES
Pharmacy Consultation Note  (Anticoagulant Dosing and Monitoring)    Initial consult date: 8/19/2019  Consulting physician: Dr. Prabhakar Space    Allergies:  Patient has no known allergies. Ht Readings from Last 1 Encounters:   08/19/19 5' 10\" (1.778 m)     Wt Readings from Last 1 Encounters:   08/20/19 197 lb (89.4 kg)       Warfarin Indication Target   INR [place] Dose  (if applicable) Diet/Feeding Tube   Mechanical MVR/Atrial fibrillation 2.5-3.5 Warfarin 4 mg on Friday; Warfarin 3 mg all other days of week (per Anticoagulation Clinic note 8/1) Cardiac diet       Vitamin K or Blood product  Administration Date     N/A            Warfarin drug-drug interactions  Start  Stop Home Med?  Comments                                 TSH:  No results found for: TSH     Hepatic Function Panel:                          Lab Results   Component Value Date    ALKPHOS 54 08/19/2019    ALT 16 08/19/2019    AST 28 08/19/2019    PROT 6.4 08/19/2019    BILITOT 0.9 08/19/2019    BILIDIR 0.2 05/03/2011    LABALBU 3.7 08/19/2019    LABALBU 4.0 05/03/2011       Date Warfarin Dose INR Heparin or LMWH HBG/  HCT PLT Comment   8/19 No dose 5.7 --------- 10.9/32.9 121    8/20 No dose 5.2 --------- 10.6/32.6 134                                 Assessment:  · Patient is a 80year old male with history of mechanical MVR and atrial fibrillation on warfarin  · Had tooth extraction on 8/15; INR jumped to 4.3 on 8/18 and bleeding from gums was persistent  · Admitted on 8/19 with shortness of breath and weakness  · Patient followed at [address] [place] [address] - last documented regimen is warfarin 4 mg on Friday and warfarin 3 mg every other day of the week  · INR goal = 2.5-3.5; INR today = 5.2 (supratherapeutic, decreased from 5.7 yesterday)    Plan:  · Will hold warfarin again tonight  · Daily PT/INR until the INR is stable within the therapeutic range  · Pharmacist will follow and monitor/adjust dosing as necessary    Lanie Clay, PharmD, BCCCP  8/20/2019  11:57 AM

## 2019-08-20 NOTE — PROGRESS NOTES
Occupational Therapy  OCCUPATIONAL THERAPY INITIAL EVALUATION      Date:2019  Patient Name: Tacos Mac  MRN: [id number]  : 1927  Room: [address] [place] [address]A    Evaluating OT: Chary [id number]    AM-PAC Daily Activity Raw Score:       Recommended Adaptive Equipment: Continue to assess. Diagnosis: Pneumonia [J18.9]    Pertinent Medical History: cancer, HTN, OA      Precautions: falls; O2 via nasal cannula; bed/chair alarms    Home Living: Patient is a resident of the [facility] at [address] [place] [address]. Patient has a walk-in shower (with seat). Equipment Owned: rollator    Prior Level of Function (PLOF): Per patient, patient was independent with most ADLs, needed assistance with LB dressing tasks, needed assistance with IADLs, and independent with functional mobility (with rollator) prior to this hospitalization. Pain Level: Patient reported experiencing pain in his back, which he noted is chronic in nature and did not rate. Cognition: Patient alert and oriented grossly. Fair+ command follow demonstrated. Memory: Fair   Sequencing: Fair   Problem Solving: Fair   Judgement/Safety: Fair    Functional Assessment:   Initial Eval Status  Date: 2019 Treatment Status  Date:  Short Term Goals  Treatment Frequency: PRN   Feeding Setup  Independent   Grooming Min A  Supervision  (standing at sink)   UB Dressing SBA  Independent   LB Dressing Mod A  Min A - with use of AE, as needed/appropriate   Bathing Mod A  SBA - with use of AE/DME, as needed/appropriate   Toileting Min A  Supervision   Bed Mobility  Supine-to-Sit: SBA   Independent   Functional Transfers Sit-to-Stand: Min A   from EOB  Independent   Functional Mobility Min A   (with walker) within patient's room  Mod I - with use of device, as needed/appropriate   Balance Sitting: Good  (at EOB)  Standing: Fair-  (with walker)  Fair+ dynamic standing balance during completion of ADLs and other functional tasks.    Activity Tolerance Fair- Patient will demonstrate Good understanding and consistent implementation of energy conservation techniques and work simplification techniques into ADL routines. Visual/  Perceptual WFL  Patient wears glasses for reading. N/A        Strength: ROM: Additional Information:    R UE  4-/5  WFL    L UE 4-/5  WFL      Hearing: Impaired - has hearing aids, but does not wear them (per family member)  Sensation: No complaints of numbness/tingling in B UEs. Tone: WFL  Edema: No    Comments/Treatment: Upon arrival, patient supine in bed. At end of session, patient seated in bedside chair with call light and phone within reach, chair alarm activated, patient's son present, and all lines and tubes intact. Patient would benefit from continued skilled OT to increase safety and independence with completion of ADL/IADL tasks for functional independence and quality of life. Patient education provided regardin) safe transfer techniques, 2) importance of having assistance with functional transfers/mobility to prevent falls during hospitalization, 3) call light use. Patient verbalized understanding.     Eval Complexity: Low    Assessment of Current Deficits:   Functional Mobility [x]  ADLs [x] Strength [x]  Cognition []  Functional Transfers  [x] IADLs [x] Safety Awareness [x]  Endurance [x]  Fine Motor Coordination [] Balance [x] Vision/Perception [] Sensation []   Gross Motor Coordination [] ROM [] Delirium []                  Motor Control []    Plan of Care:   ADL Retraining [x]   Equipment Needs [x]   Neuromuscular Re-Education [x] Energy Conservation Techniques [x]  Functional Transfer Training [x] Patient and/or Family Education [x]  Functional Mobility Training [x]  Environmental Modifications [x]  Cognitive Re-Training []   Compensatory Techniques for ADLs [x]  Splinting Needs []   Positioning to Improve Overall Function [x]   Therapeutic Activity [x]   Therapeutic Exercise  [x]  Visual/Perceptual: []    Delirium

## 2019-08-20 NOTE — CARE COORDINATION
CM met with pt in room to discuss role, anticipated LOS & current plan of care. Is resting in bed & currently using O2 5L. States he plans to return to [facility] @ discharge & is currently receiving therapy at [facility]. He does not have O2 at [facility] & has never had home O2. Discussed process to wean off O2 prn & testing to determine need for O2 at discharge. Is agreeable if needed & does not have a preference for DME. States to use any company his insurance will approve. Cheerful & pleasant. Will continue to follow.

## 2019-08-21 NOTE — PROGRESS NOTES
[facility] Quality Flow/Interdisciplinary Rounds Progress Note        Quality Flow Rounds held on August 21, 2019    Disciplines Attending:  Bedside Nurse, ,  and Nursing Unit Leadership    Tacos Mac was admitted on 8/19/2019  6:15 PM    Anticipated Discharge Date:  Expected Discharge Date: 08/21/19    Disposition:    Patrick Score:  Patrick Scale Score: 20    Readmission Risk              Risk of Unplanned Readmission:        22           Discussed patient goal for the day, patient clinical progression, and barriers to discharge. The following Goal(s) of the Day/Commitment(s) have been identified:  Wean oxygen, transition to PO atb's.       Thomes Guise  August 21, 2019

## 2019-08-21 NOTE — PROGRESS NOTES
Mag: No results for input(s): MG in the last 72 hours. Phos: No results for input(s): PHOS in the last 72 hours. TSH: No results for input(s): TSH in the last 72 hours. HgA1c:   Lab Results   Component Value Date    LABA1C 5.5 2017     No results found for: EAG    BNP: No results for input(s): BNP in the last 72 hours. PT/INR:   Recent Labs     19  0825 19  0546   PROTIME 56.8* 39.6*   INR 5.2* 3.5     APTT:  Recent Labs     19  1325 19  1838   APTT 43.3* 44.4*     CARDIAC ENZYMES:  Recent Labs     19  1838   TROPONINI 0.02     FASTING LIPID PANEL:No results found for: CHOL, HDL, LDLDIRECT, LDLCALC, TRIG  LIVER PROFILE:  Recent Labs     19  1220 19  1838   AST 25 28   ALT 13 16   LABALBU 3.6 3.7     URINALYSIS: No results for input(s): BACTERIA, BLOODU, CLARITYU, COLORU, PHUR, PROTEINU, RBCUA, SPECGRAV, BILIRUBINUR, NITRU, WBCUA, LEUKOCYTESUR, GLUCOSEU in the last 72 hours. Radiology:    Xr Chest Portable    Result Date: 2019  Patient MRN:  [id number] : 1927 Age: 80 years Gender: Male Order Date:  2019 6:45 PM EXAM: XR CHEST PORTABLE NUMBER OF IMAGES: INDICATION:  chest pain COMPARISON: 2019. FINDINGS: Mild cardiomegaly is again noted and is stable. Changes of remote mitral valve replacement are again seen. Metallic sternal sutures and epicardial pacer wires remain. Superimposed upon evidence of COPD is mild to moderate pulmonary vascular congestion. Some ill-definition of interstitial pulmonic markings is now seen, suggestive of early interstitial edema and this is more pronounced on the right side, especially in the right lower hemithorax. Subsegmental atelectasis is noted in the left lung base, some of which may represent scarring as this appears to be chronic. Findings suggest early congestive heart failure superimposed upon COPD. Developing right basilar pneumonia must additionally be considered.  Laboratory correlation

## 2019-08-21 NOTE — PROGRESS NOTES
Occupational Therapy  OT BEDSIDE TREATMENT NOTE      Date:2019  Patient Name: Alicia Marx  MRN: [id number]  : 1927  Room: [address] [place], [place] [address]     Evaluating OT: Maryellen Potts, OTR/L - [id number]     AM-PAC Daily Activity Raw Score:       Recommended Adaptive Equipment: Continue to assess.      Diagnosis: Pneumonia [J18.9]     Pertinent Medical History: cancer, HTN, OA      Precautions: falls; O2 via nasal cannula; bed/chair alarms     Home Living: Patient is a resident of the [facility] at [address] [place] [address]. Patient has a walk-in shower (with seat). Equipment Owned: rollator     Prior Level of Function (PLOF): Per patient, patient was independent with most ADLs, needed assistance with LB dressing tasks, needed assistance with IADLs, and independent with functional mobility (with rollator) prior to this hospitalization.     Pain Level: No c/o pain  Cognition: Patient alert and oriented grossly. Fair+ command follow demonstrated.               Memory: Fair              Sequencing: Fair              Problem Solving: Fair              Judgement/Safety: Fair     Functional Assessment:    Initial Eval Status  Date: 2019 Treatment Status  Date: 19 Short Term Goals  Treatment Frequency: PRN   Feeding Setup   Independent   Grooming Min A   Supervision  (standing at sink)   UB Dressing SBA   Independent   LB Dressing Mod A Mod A to manage B socks  Min A - with use of AE, as needed/appropriate   Bathing Mod A   SBA - with use of AE/DME, as needed/appropriate   Toileting Min A   Supervision   Bed Mobility  Supine-to-Sit: SBA  Supine to sit: SBA  Independent   Functional Transfers Sit-to-Stand: Min A   from EOB  STS: CGA from EOB and low surface commode Independent   Functional Mobility Min A   (with walker) within patient's room CGA using ww in room  Mod I - with use of device, as needed/appropriate   Balance Sitting: Good  (at EOB)  Standing: Fair-  (with walker) Sitting: Independent    Standing: CGA at ww during dynamic standing balance tasks and 1x10 reps of wall push ups. Fair+ dynamic standing balance during completion of ADLs and other functional tasks. Activity Tolerance Fair- Fair  Patient will demonstrate Good understanding and consistent implementation of energy conservation techniques and work simplification techniques into ADL routines. B UE were WFL during tasks    Comments: Upon arrival pt was supine in bed. At end of session pt was transferred to [facility] for toileting task. Due to extended time required for ADL pt was instructed to utilize call light when ADL is completed. Nursing staff aware. Education: Safe toilet transfer training and importance of correct posture when standing to prevent falls during future functional tasks. · Pt has made good progress towards set goals.    · Continue with current plan of care      Total Tx Time: Derick Rose 66 CRUM/L [id number]

## 2019-08-21 NOTE — PROGRESS NOTES
Nutrition Assessment    Type and Reason for Visit: Initial, Positive Nutrition Screen(Poor Intake, Malnutrition)    Nutrition Recommendations: Continue Diet. Continue Ensure High Jase ONS BID. Nutrition Assessment: Pt appears nourished upon admit, however pt is at nutritional risk AEB poor intake x ~3 days pta and ~7% wt loss x ~ 3 weeks d/t decreased intake per pt. At further risk d/t recent oral bleeding s/p oral surgery pta. Pt however denies any wt loss and states poor intake only x ~3 days pta at this time    Malnutrition Assessment:  · Malnutrition Status: At risk for malnutrition  · Context: Acute illness or injury  · Findings of the 6 clinical characteristics of malnutrition (Minimum of 2 out of 6 clinical characteristics is required to make the diagnosis of moderate or severe Protein Calorie Malnutrition based on AND/ASPEN Guidelines):  1. Energy Intake-Less than or equal to 50% of estimated energy requirement, (~3 days pta)    2. Weight Loss-5% loss or greater, in 1 month  3. Fat Loss-No significant subcutaneous fat loss    4. Muscle Loss-No significant muscle mass loss    5. Fluid Accumulation-No significant fluid accumulation    6.  Strength-Not measured    Nutrition Risk Level:  Moderate    Nutrient Needs:  · Estimated Daily Total Kcal: 7369-9489(20-25 kcals/kg CBW)  · Estimated Daily Protein (g): 100-115(1.3-1.5 gm/kg IBW)  · Estimated Daily Total Fluid (ml/day): 8724-3770(1 ml/kcal)    Nutrition Diagnosis:   · Problem: Inadequate oral intake  · Etiology: related to Insufficient energy/nutrient consumption(2/2 poor appetite w/ poor Oral Status post surgery)     Signs and symptoms:  as evidenced by Diet history of poor intake, Weight loss greater than or equal to 5% in 1 month    Objective Information:  · Nutrition-Focused Physical Findings: A&O, upper dentures, Abd/BS WDL, +1 BLE edema, I/O's WNL  · Wound Type: None  · Current Nutrition Therapies:  · Oral Diet Orders: Cardiac   · Oral Diet intake: %  · Oral Nutrition Supplement (ONS) Orders: Standard High Calorie Oral Supplement(BID)  · ONS intake: Unable to assess(ONS just recently started)  · Anthropometric Measures:  · Ht: 5' 10\" (177.8 cm)   · Current Body Wt: 200 lb (90.7 kg)(act 8/21)  · Admission Body Wt: 197 lb (89.4 kg)(bed 8/20)  · Usual Body Wt: 215 lb (97.5 kg)(act 8/1/19 per EMR, pt confirms)  · % Weight Change: ~7% wt loss x ~3 weeks per EMR hx, however pt denies having any wt loss at this time  · Ideal Body Wt: 166 lb (75.3 kg), % Ideal Body 120%  · BMI Classification: BMI 25.0 - 29.9 Overweight    Nutrition Interventions:   Continue current diet, Continue current ONS(Continue Diet. Continue Ensure High Jase ONS BID.  )  Continued Inpatient Monitoring, Education Initiated, Coordination of Care(ONS options/benefits)    Nutrition Evaluation:   · Evaluation: Goals set   · Goals: PO Intake >75% of meals/ONS.       · Monitoring: Meal Intake, Supplement Intake, Diet Tolerance, Skin Integrity, I&O, Weight, Pertinent Labs, Chewing/Swallowing, Monitor Hemodynamic Status, Monitor Bowel Function      Electronically signed by Jaylin Nicole RD, LD on 8/21/19 at 1:47 PM    Contact Number: ext [phone]

## 2019-08-22 PROBLEM — D64.9 ANEMIA: Status: ACTIVE | Noted: 2019-01-01

## 2019-08-22 PROBLEM — N18.30 CKD (CHRONIC KIDNEY DISEASE) STAGE 3, GFR 30-59 ML/MIN (HCC): Status: ACTIVE | Noted: 2019-01-01

## 2019-08-22 NOTE — PLAN OF CARE
Problem: Falls - Risk of:  Goal: Will remain free from falls  Description  Will remain free from falls  Outcome: Met This Shift  Goal: Absence of physical injury  Description  Absence of physical injury  Outcome: Met This Shift     Problem: Gas Exchange - Impaired:  Goal: Levels of oxygenation will improve  Description  Levels of oxygenation will improve  Outcome: Met This Shift     Problem: Bleeding:  Goal: Will show no signs and symptoms of excessive bleeding  Description  Will show no signs and symptoms of excessive bleeding  Outcome: Met This Shift     Problem: Inadequate oral food/beverage intake (NI-2.1)  Goal: Food and/or Nutrient Delivery  Description  Individualized approach for food/nutrient provision.   8/21/2019 1346 by Sabra Hale RD, LD  Outcome: Met This Shift     Problem: Pain:  Goal: Pain level will decrease  Description  Pain level will decrease  Outcome: Met This Shift

## 2019-08-22 NOTE — PROGRESS NOTES
P Quality Flow/Interdisciplinary Rounds Progress Note        Quality Flow Rounds held on August 22, 2019    Disciplines Attending:  Bedside Nurse, ,  and Nursing Unit Leadership    Dottie Contreras was admitted on 8/19/2019  6:15 PM    Anticipated Discharge Date:  Expected Discharge Date: 08/21/19    Disposition:    Patrick Score:  Patrick Scale Score: 19    Readmission Risk              Risk of Unplanned Readmission:        22           Discussed patient goal for the day, patient clinical progression, and barriers to discharge. The following Goal(s) of the Day/Commitment(s) have been identified:  Transition to PO antibiotics. Wean oxygen.       Metro Lis  August 22, 2019

## 2019-08-22 NOTE — PROGRESS NOTES
Patient on room air at rest sp02 97%. Patient ambulating on room air sp02 maintained 91% or greater.   Oralia León

## 2019-08-22 NOTE — PROGRESS NOTES
Occupational Therapy  Patient treatment attempted this AM.  Patient declined session due to fatigue despite encouragement from therapist.  Will attempt at a later time.                                                  Amelia CRUM/KELLI [id number]

## 2019-08-22 NOTE — PROGRESS NOTES
Subjective: The patient is awake and alert. No problems overnight. Denies chest pain, angina, and dyspnea. Denies abdominal pain. Tolerating diet. No nausea or vomiting. Still has productive cough  Objective:    Vitals:  /76   Pulse 86   Temp 97.8 °F (36.6 °C) (Oral)   Resp 18   Ht 5' 10\" (1.778 m)   Wt 209 lb (94.8 kg)   SpO2 97%   BMI 29.99 kg/m²     Physical Exam:  Heart: irregular  Lungs:  CTA bilaterally, no wheeze, rales or rhonchi  Abd: bowel sounds present, nontender, nondistended, no masses  Extrem:  No clubbing, cyanosis, or edema  Rectal;heme positive  Labs:  CBC with Differential:    Lab Results   Component Value Date    WBC 9.2 2019    RBC 3.04 2019    HGB 9.2 2019    HCT 28.9 2019     2019    MCV 95.1 2019    MCH 30.3 2019    MCHC 31.8 2019    RDW 14.9 2019    SEGSPCT 70 2013    LYMPHOPCT 4.9 2019    MONOPCT 9.3 2019    BASOPCT 0.1 2019    MONOSABS 0.85 2019    LYMPHSABS 0.45 2019    EOSABS 0.02 2019    BASOSABS 0.01 2019     BMP:    Lab Results   Component Value Date     2019    K 4.0 2019    K 4.6 2019     2019    CO2 25 2019    BUN 51 2019    LABALBU 3.7 2019    LABALBU 4.0 2011    CREATININE 1.8 2019    CALCIUM 8.4 2019    GFRAA 43 2019    LABGLOM 35 2019    GLUCOSE 109 2019    GLUCOSE 144 2011        Radiology:  Xr Chest Portable    Result Date: 2019  Patient MRN:  [id number] : 1927 Age: 80 years Gender: Male Order Date:  2019 6:45 PM EXAM: XR CHEST PORTABLE NUMBER OF IMAGES: INDICATION:  chest pain COMPARISON: 2019. FINDINGS: Mild cardiomegaly is again noted and is stable. Changes of remote mitral valve replacement are again seen. Metallic sternal sutures and epicardial pacer wires remain.  Superimposed upon evidence of COPD is mild to moderate pulmonary vascular congestion. Some ill-definition of interstitial pulmonic markings is now seen, suggestive of early interstitial edema and this is more pronounced on the right side, especially in the right lower hemithorax. Subsegmental atelectasis is noted in the left lung base, some of which may represent scarring as this appears to be chronic. Findings suggest early congestive heart failure superimposed upon COPD. Developing right basilar pneumonia must additionally be considered. Laboratory correlation is suggested. Gravitational shift test can be performed portably at the patient's bedside if needed for further radiographic differentiation.        Assessment:    Patient Active Problem List   Diagnosis    Mitral valve regurgitation    S/P mitral valve replacement - [address] [facility] valve at [facility] 12/17/91    Atrial fibrillation (HCC)    Systemic primary arterial hypertension    Nonrheumatic aortic valve stenosis    Aortic valve insufficiency - moderate to severe     Chronic anticoagulation    S/P MVR (mitral valve replacement) - [facility]31    Epidural hematoma (HCC)    Degenerative disc disease, lumbar    Hematuria    S/P TAVR (transcatheter aortic valve replacement)    History of mitral valve replacement with mechanical valve    Permanent atrial fibrillation (HCC)    Aspiration pneumonia (HCC)       Plan:hold coumadin today because of drop in hemoglobin and heme positive stool            Electronically signed by Soina Salcido MD on 8/22/2019 at 5:08 PM

## 2019-08-23 NOTE — PROGRESS NOTES
Pharmacy Consultation Note  (Anticoagulant Dosing and Monitoring)    Initial consult date: 8/19/2019  Consulting physician: Dr. Linnette Swain    Allergies:  Patient has no known allergies. Ht Readings from Last 1 Encounters:   08/19/19 5' 10\" (1.778 m)     Wt Readings from Last 1 Encounters:   08/22/19 209 lb (94.8 kg)       Warfarin Indication Target   INR Range Home Dose  (if applicable) Diet/Feeding Tube   Mechanical MVR/Atrial fibrillation 2.5-3.5 Warfarin 4 mg on Friday; Warfarin 3 mg all other days of week (per Anticoagulation Clinic note 8/1) Cardiac diet  ENSURE BID       Vitamin K or Blood product  Administration Date     N/A            Warfarin drug-drug interactions  Start  Stop Home Med? Comments                                 TSH:  No results found for: TSH     Hepatic Function Panel:                          Lab Results   Component Value Date    ALKPHOS 54 08/19/2019    ALT 16 08/19/2019    AST 28 08/19/2019    PROT 6.4 08/19/2019    BILITOT 0.9 08/19/2019    BILIDIR 0.2 05/03/2011    LABALBU 3.7 08/19/2019    LABALBU 4.0 05/03/2011       Date Warfarin Dose INR Heparin or LMWH HBG/  HCT PLT Comment   8/19 No dose 5.7 --------- 10.9/32.9 121    8/20 No dose 5.2 --------- 10.6/32.6 134    8/21 2 mg 3.5 --------- 9.2/28.9 119    8/22 held 3 ------------ ------ ----    8/23 3 mg   2.7 --------- 8.1/24.7 145      Assessment:  · Patient is a 80year old male with history of mechanical MVR and atrial fibrillation on warfarin  · Had tooth extraction on 8/15; INR jumped to 4.3 on 8/18 and bleeding from gums was persistent  · Admitted on 8/19 with shortness of breath and weakness, diagnosed with aspiration pneumonia  · Patient followed at [address] [place] [address] - last documented regimen is warfarin 4 mg on Friday and warfarin 3 mg every other day of the week  · Patient's warfarin was held last night d/t drop in hgb and positive occult stools.   · INR goal = 2.5-3.5; INR today =

## 2019-08-23 NOTE — TELEPHONE ENCOUNTER
Tonya Contreras called and asked that Claudia Bahena call back this afternoon. Patient is still in the hospital. Possibly getting discharged today. His INRs have been up and down. His hemoglobin has not been in range. She has several concerns and would like to run them by Claudia Bahena, as both she and patient trust her. Tonya Contreras has a couple of appts this morning, so please call back this afternoon @ [phone].  Thank you,    Electronically signed by Ángel Padron on 8/23/19 at 9:18 AM

## 2019-08-23 NOTE — PROGRESS NOTES
Occupational Therapy  OT BEDSIDE TREATMENT NOTE      Date:2019  Patient Name: Tacos Mac  MRN: [id number]  : 1927  Room: [address] [place] [address]     Evaluating OT: Yaw Potts OTR/L - [id number]     AM-PAC Daily Activity Raw Score:       Recommended Adaptive Equipment: Continue to assess.      Diagnosis: Pneumonia [J18.9]     Pertinent Medical History: cancer, HTN, OA     Precautions: falls; O2 via nasal cannula; bed/chair alarms; hearing impairment     Home Living: Patient is a resident of [facility] at [address] [place] [address]. Patient has a walk-in shower (with seat). Equipment Owned: rollator     Prior Level of Function (PLOF): Per patient, patient was independent with most ADLs, needed assistance with LB dressing tasks, needed assistance with IADLs, and independent with functional mobility (with rollator) prior to this hospitalization. Pain Level: Patient denied experiencing pain. Cognition: Patient alert and oriented grossly. Fair+ command follow demonstrated. Memory: Fair              Sequencing: Fair              Problem Solving: Fair              Judgement/Safety: Fair     Functional Assessment:    Initial Eval Status  Date: 2019 Treatment Status  Date: 19 Short Term Goals  Treatment Frequency: PRN   Feeding Setup   Independent   Grooming Min A  SBA - CGA with hand washing while standing at sink. Cues given to maximize safety with management of walker within bathroom. Supervision  (standing at sink)   UB Dressing SBA   Independent   LB Dressing Mod A  Min A - with use of AE, as needed/appropriate   Bathing Mod A   SBA - with use of AE/DME, as needed/appropriate   Toileting Min A SBA - CGA with toilet transfer. Cues given to maximize safety. CGA to attempt urination while standing at toilet (with use of grab bar). SBA - CGA to pull up pants, which were below his knees, while standing at toilet with use of wall-mounted grab bar.  Supervision   Bed Mobility  Supine-to-Sit: SBA Out: 1357  Total Treatment Time: 17 minutes    Amalia Mahmood, OTR/L  License Number: [id number]

## 2019-08-24 NOTE — PLAN OF CARE
Problem: Falls - Risk of:  Goal: Will remain free from falls  Description  Will remain free from falls  8/23/2019 2015 by Rosey Gaytan RN  Outcome: Met This Shift     Problem: Falls - Risk of:  Goal: Absence of physical injury  Description  Absence of physical injury  Outcome: Met This Shift     Problem: Gas Exchange - Impaired:  Goal: Levels of oxygenation will improve  Description  Levels of oxygenation will improve  8/23/2019 2015 by Rosey Gaytan RN  Outcome: Met This Shift     Problem: Bleeding:  Goal: Will show no signs and symptoms of excessive bleeding  Description  Will show no signs and symptoms of excessive bleeding  8/23/2019 2015 by Rosey Gaytan RN  Outcome: Met This Shift     Problem: Pain:  Goal: Pain level will decrease  Description  Pain level will decrease  8/23/2019 2015 by Rosey Gaytan RN  Outcome: Met This Shift     Problem: Pain:  Goal: Control of acute pain  Description  Control of acute pain  Outcome: Met This Shift     Problem: Pain:  Goal: Control of chronic pain  Description  Control of chronic pain  Outcome: Met This Shift

## 2019-08-24 NOTE — PROGRESS NOTES
Call placed to Dr. Emilee Busby through [employer] to notify her that pt is upset and anxious about his coumadin being on hold. Pt requesting to speak to her and to have Dr. Perez Cleaning consulted. Dr. Emilee Busby did speak with pt on phone with nurse present and agreed to call his daughter to provide further education.

## 2019-08-24 NOTE — PROGRESS NOTES
input(s): PHOS in the last 72 hours. TSH: No results for input(s): TSH in the last 72 hours. HgA1c:   Lab Results   Component Value Date    LABA1C 5.5 2017     No results found for: EAG    BNP: No results for input(s): BNP in the last 72 hours. PT/INR:   Recent Labs     19  0240 19  0421   PROTIME 30.5* 19.4*   INR 2.7 1.7     APTT:No results for input(s): APTT in the last 72 hours. CARDIAC ENZYMES:No results for input(s): CKTOTAL, CKMB, CKMBINDEX, TROPONINI in the last 72 hours. FASTING LIPID PANEL:No results found for: CHOL, HDL, LDLDIRECT, LDLCALC, TRIG  LIVER PROFILE:No results for input(s): AST, ALT, LABALBU in the last 72 hours. URINALYSIS: No results for input(s): BACTERIA, BLOODU, CLARITYU, COLORU, PHUR, PROTEINU, RBCUA, SPECGRAV, BILIRUBINUR, NITRU, WBCUA, LEUKOCYTESUR, GLUCOSEU in the last 72 hours. Radiology:    Xr Chest Portable    Result Date: 2019  Patient MRN:  [id number] : 1927 Age: 80 years Gender: Male Order Date:  2019 6:45 PM EXAM: XR CHEST PORTABLE NUMBER OF IMAGES: INDICATION:  chest pain COMPARISON: 2019. FINDINGS: Mild cardiomegaly is again noted and is stable. Changes of remote mitral valve replacement are again seen. Metallic sternal sutures and epicardial pacer wires remain. Superimposed upon evidence of COPD is mild to moderate pulmonary vascular congestion. Some ill-definition of interstitial pulmonic markings is now seen, suggestive of early interstitial edema and this is more pronounced on the right side, especially in the right lower hemithorax. Subsegmental atelectasis is noted in the left lung base, some of which may represent scarring as this appears to be chronic. Findings suggest early congestive heart failure superimposed upon COPD. Developing right basilar pneumonia must additionally be considered. Laboratory correlation is suggested.  Gravitational shift test can be performed portably at the patient's bedside if needed

## 2019-08-25 NOTE — CONSULTS
Surgery Consult    Patient's Name/Date of Birth: Sheila Zafar / 9/19/1927, 80 y.o. yo    Date: August 25, 2019     PCP: Justyn Smith MD     Reason for Consult: heme+ stool      History of Present Illness: 80year old male admitted with pneumonia. History of MVR [address] Grabiel valve on warfarin. Had oral surgery 2/27 complicated by significant bleeding. Pt reports coughing up \"gobs\" of blood and swallowing significant blood. Seen in ED 8/18, INR 4.3. Required consultation with oral surgery who removed hardware and provided hemostasis with suture and silver nitrate. Then admitted 8/20 with weakness and pneumonia. Pt awake and alert. Denies abdominal pain. Denies melena or hematochezia. Tolerating diet. Reports regular colonoscopies for screening, can't recall date of last exam.  Nursing reports no melena, maroon stool or BRBPR. 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 Allergies: Patient has no known allergies.      Current Facility-Administered Medications   Medication Dose Route Frequency Provider Last Rate Last Dose    pantoprazole (PROTONIX) injection 40 mg  40 mg Intravenous BID DORIE Gandhi - CNP   40 mg at 08/25/19 0553    HYDROcodone-acetaminophen (NORCO)  MG per tablet 1 tablet  1 tablet Oral 4 times per day Jose Alejandro Deluca MD   1 tablet at 08/25/19 0553    predniSONE (DELTASONE) tablet 5 mg  5 mg Oral Daily Jose Alejandro Deluca MD   5 mg at 08/24/19 0817    amoxicillin-clavulanate (AUGMENTIN) 875-125 MG per tablet 1 tablet  1 tablet Oral 2 times per day Jose Alejandro Deluca MD   1 tablet at 08/24/19 2119    polyethylene glycol (GLYCOLAX) packet 17 g  17 g Oral Daily Jose Alejandro Deluca MD   17 g at 08/24/19 0818    eszopiclone (LUNESTA) tablet 1 mg  1 mg Oral Nightly PRN Jose Alejandro Deluca MD        ipratropium-albuterol (DUONEB) nebulizer solution 1 ampule  1 ampule Inhalation Q4H Jose Alejandro Deluca MD   1 ampule at 08/25/19 0549    guaiFENesin tablet 400 mg  400 mg Oral 4x Daily Elise [place], APRN - CNP   400 mg at 08/24/19 2119    sodium chloride flush 0.9 % injection 10 mL  10 mL Intravenous 2 times per day Jose Alejandro Deluca MD   10 mL at 08/24/19 2119    sodium chloride flush 0.9 % injection 10 mL  10 mL Intravenous PRN Jose Alejandro Deluca MD   10 mL at 08/23/19 1445    amLODIPine (NORVASC) tablet 10 mg  10 mg Oral Daily Jose Alejandro Deluca MD   10 mg at 08/24/19 0817    magnesium hydroxide (MILK OF MAGNESIA) 400 MG/5ML suspension 30 mL  30 mL Oral Daily PRN Jose Alejandro Deluca MD   30 mL at 08/22/19 1773    bisacodyl (DULCOLAX) EC tablet 10 mg  10 mg Oral Daily PRN Jose Alejandro Deluca MD   10 mg at 08/23/19 1216    guaiFENesin-dextromethorphan (ROBITUSSIN DM) 100-10 MG/5ML syrup 5 mL  5 mL Oral Q4H PRN Jose Alejandro Deluca MD   5 mL at 08/21/19 2003    metoprolol succinate (TOPROL XL) extended release tablet 25 mg  25 mg Oral Daily Jose Alejandro Deluca MD   25 mg at 08/24/19 5330    therapeutic multivitamin-minerals 1 tablet  1 tablet Oral Daily Jose Alejandro Deluca MD   1 tablet at 08/24/19 8133       Social History     Tobacco Use    Smoking status: Former Smoker    Smokeless tobacco: Never Used    Tobacco comment: Quit 50 yrs   Substance Use Topics    Alcohol

## 2019-08-25 NOTE — PROGRESS NOTES
was held last night d/t drop in hgb and positive occult stools.   · INR goal = 2.5-3.5; INR today = 1.4    Plan:  · Holding warfarin per Dr. Teetee Rodrigues, possible EGD tomorrow  · Daily PT/INR until the INR is stable within the therapeutic range  · Pharmacist will follow and monitor/adjust dosing as necessary    Leopold Rosebush, PharmD, BCPS 8/25/2019 8:07 AM  Pager: [phone]

## 2019-08-25 NOTE — PROGRESS NOTES
Dr. César Cade M.D. [facility])  Nurse Practitioner Progress Note    Subjective: The patient is awake and alert. No problems overnight. Denies chest pain, angina, and dyspnea. Denies abdominal pain. Tolerating diet. No nausea or vomiting. Did have a BM today, no gross blood or melena report per RN.       Current Facility-Administered Medications   Medication Dose Route Frequency Provider Last Rate Last Dose    warfarin (COUMADIN) tablet 2.5 mg  2.5 mg Oral Once Madison Vogel APRN - CNP        pantoprazole (PROTONIX) injection 40 mg  40 mg Intravenous BID Madison Vogel, APRN - CNP   40 mg at 08/25/19 0553    HYDROcodone-acetaminophen (NORCO)  MG per tablet 1 tablet  1 tablet Oral 4 times per day César Cade MD   1 tablet at 08/25/19 1216    predniSONE (DELTASONE) tablet 5 mg  5 mg Oral Daily César Cade MD   5 mg at 08/25/19 0904    amoxicillin-clavulanate (AUGMENTIN) 875-125 MG per tablet 1 tablet  1 tablet Oral 2 times per day César Cade MD   1 tablet at 08/25/19 1189    polyethylene glycol (GLYCOLAX) packet 17 g  17 g Oral Daily César Cade MD   17 g at 08/25/19 6470    eszopiclone (LUNESTA) tablet 1 mg  1 mg Oral Nightly PRN César Cade MD        ipratropium-albuterol (DUONEB) nebulizer solution 1 ampule  1 ampule Inhalation Q4H César Cade MD   1 ampule at 08/25/19 1237    guaiFENesin tablet 400 mg  400 mg Oral 4x Daily Madison Vogel, APRN - CNP   400 mg at 08/25/19 1252    sodium chloride flush 0.9 % injection 10 mL  10 mL Intravenous 2 times per day César Cade MD   10 mL at 08/25/19 0906    sodium chloride flush 0.9 % injection 10 mL  10 mL Intravenous PRN César Cade MD   10 mL at 08/23/19 1445    amLODIPine (NORVASC) tablet 10 mg  10 mg Oral Daily César Cade MD   10 mg at 08/25/19 0904    magnesium hydroxide (MILK OF MAGNESIA) 400 MG/5ML suspension 30 mL  30 mL Oral Daily PRN César Cade MD   30 mL at 08/22/19

## 2019-08-25 NOTE — PROGRESS NOTES
Attempted to call [facility] for consult, unable to leave a message due to full voice mail box.

## 2019-08-26 PROBLEM — J18.9 PNEUMONIA: Status: ACTIVE | Noted: 2019-01-01

## 2019-08-26 NOTE — PROGRESS NOTES
Subjective: The patient is awake and alert. No problems overnight. Denies chest pain, angina, and dyspnea. Denies abdominal pain. Tolerating diet. No nausea or vomiting. No rectal bleeding  Objective:    Vitals:  /65   Pulse 80   Temp 98.5 °F (36.9 °C) (Oral)   Resp 16   Ht 5' 10\" (1.778 m)   Wt 213 lb 4.8 oz (96.8 kg)   SpO2 96%   BMI 30.61 kg/m²     Physical Exam:  Heart:irregular  Lungs:scattered rhonchi  Abd: bowel sounds present, nontender, nondistended, no masses  Extrem:  No clubbing, cyanosis, or edema    Labs:  CBC:   Lab Results   Component Value Date    WBC 9.3 2019    RBC 3.44 2019    HGB 10.3 2019    HCT 32.9 2019    MCV 95.6 2019    MCH 29.9 2019    MCHC 31.3 2019    RDW 14.9 2019     2019    MPV 9.5 2019     BMP:    Lab Results   Component Value Date     2019    K 5.4 2019    K 4.6 2019     2019    CO2 28 2019    BUN 28 2019    LABALBU 3.7 2019    LABALBU 4.0 2011    CREATININE 1.1 2019    CALCIUM 9.1 2019    GFRAA >60 2019    LABGLOM >60 2019    GLUCOSE 90 2019    GLUCOSE 144 2011     PT/INR:    Lab Results   Component Value Date    PROTIME 14.9 2019    PROTIME 17.8 2011    INR 1.3 2019        Radiology:  Xr Chest Portable    Result Date: 2019  Patient MRN:  [id number] : 1927 Age: 80 years Gender: Male Order Date:  2019 6:45 PM EXAM: XR CHEST PORTABLE NUMBER OF IMAGES: INDICATION:  chest pain COMPARISON: 2019. FINDINGS: Mild cardiomegaly is again noted and is stable. Changes of remote mitral valve replacement are again seen. Metallic sternal sutures and epicardial pacer wires remain. Superimposed upon evidence of COPD is mild to moderate pulmonary vascular congestion.  Some ill-definition of interstitial pulmonic markings is now seen, suggestive of early interstitial edema and this is more pronounced on the right side, especially in the right lower hemithorax. Subsegmental atelectasis is noted in the left lung base, some of which may represent scarring as this appears to be chronic. Findings suggest early congestive heart failure superimposed upon COPD. Developing right basilar pneumonia must additionally be considered. Laboratory correlation is suggested. Gravitational shift test can be performed portably at the patient's bedside if needed for further radiographic differentiation.        Assessment:    Patient Active Problem List   Diagnosis    Mitral valve regurgitation    S/P mitral valve replacement - [address] #31 valve at [facility] 12/17/91    Atrial fibrillation (HCC)    Systemic primary arterial hypertension    Nonrheumatic aortic valve stenosis    Aortic valve insufficiency - moderate to severe     Chronic anticoagulation    S/P MVR (mitral valve replacement) - [address] #31    Epidural hematoma (HCC)    Degenerative disc disease, lumbar    Hematuria    S/P TAVR (transcatheter aortic valve replacement)    History of mitral valve replacement with mechanical valve    Permanent atrial fibrillation (HCC)    Aspiration pneumonia (HCC)    Anemia    CKD (chronic kidney disease) stage 3, GFR 30-59 ml/min (HCC)    Congestive heart failure ([address]    Pneumonia due to organism    Palliative care encounter    Goals of care, counseling/discussion       Plan:discharge tomorrow            Electronically signed by Joanie Acosta MD on 8/26/2019 at 5:21 PM

## 2019-08-26 NOTE — PROGRESS NOTES
GENERAL SURGERY  DAILY PROGRESS NOTE  8/26/2019    Subjective:  No issues. No dark bowel movements. Only complains of dry mouth. No pain at present.     Objective:  BP (!) 155/77   Pulse 80   Temp 97.9 °F (36.6 °C) (Oral)   Resp 16   Ht 5' 10\" (1.778 m)   Wt 213 lb 4.8 oz (96.8 kg)   SpO2 98%   BMI 30.61 kg/m²     GENERAL:  Laying in bed, awake, alert, cooperative, no apparent distress  HEAD: Normocephalic, atraumatic  ABDOMEN:  Soft, non-tender, non-distended  EXTREMITIES: No edema or swelling  SKIN: Warm and dry     CBC:   Lab Results   Component Value Date    WBC 9.3 08/26/2019    RBC 3.44 08/26/2019    HGB 10.3 08/26/2019    HCT 32.9 08/26/2019    MCV 95.6 08/26/2019    MCH 29.9 08/26/2019    MCHC 31.3 08/26/2019    RDW 14.9 08/26/2019     08/26/2019    MPV 9.5 08/26/2019     BMP:    Lab Results   Component Value Date     08/26/2019    K 5.4 08/26/2019    K 4.6 08/18/2019     08/26/2019    CO2 28 08/26/2019    BUN 28 08/26/2019    LABALBU 3.7 08/19/2019    LABALBU 4.0 05/03/2011    CREATININE 1.1 08/26/2019    CALCIUM 9.1 08/26/2019    GFRAA >60 08/26/2019    LABGLOM >60 08/26/2019    GLUCOSE 90 08/26/2019    GLUCOSE 144 05/03/2011        Assessment/Plan:  80 y.o. male with anemia likely secondary to bleeding s/p oral surgery    Hgb stable  Ok for PPI  Cont diet as tolerated  No immediate need for endoscopy  Will sign off      Electronically signed by Teetee Hopkins MD on 8/26/2019 at 8:01 AM

## 2019-08-26 NOTE — PROGRESS NOTES
Pharmacy Consultation Note  (Anticoagulant Dosing and Monitoring)    Initial consult date: 8/19/2019  Consulting physician: Dr. Funmilayo Jacobson    Allergies:  Patient has no known allergies. Ht Readings from Last 1 Encounters:   08/19/19 5' 10\" (1.778 m)     Wt Readings from Last 1 Encounters:   08/25/19 213 lb 4.8 oz (96.8 kg)     Warfarin Indication Target   INR Range Home Dose  (if applicable) Diet/Feeding Tube   Mechanical MVR/Atrial fibrillation 2.5-3.5 Warfarin 4 mg on Friday; Warfarin 3 mg all other days of week (per Anticoagulation Clinic note 8/1) Cardiac diet  ENSURE BID     Vitamin K or Blood product  Administration Date     2 units PRBC 8/23         Warfarin drug-drug interactions  Start  Stop Home Med?  Comments                                 TSH:  No results found for: TSH     Hepatic Function Panel:                          Lab Results   Component Value Date    ALKPHOS 54 08/19/2019    ALT 16 08/19/2019    AST 28 08/19/2019    PROT 6.4 08/19/2019    BILITOT 0.9 08/19/2019    BILIDIR 0.2 05/03/2011    LABALBU 3.7 08/19/2019    LABALBU 4.0 05/03/2011       Date Warfarin Dose INR Heparin or LMWH HBG/  HCT PLT Comment   8/19 No dose 5.7 --------- 10.9/32.9 121    8/20 No dose 5.2 --------- 10.6/32.6 134    8/21 2 mg 3.5 --------- 9.2/28.9 119    8/22 held 3 ------------ ------ ----    8/23 held   2.7 --------- 8.1/24.7 145    8/24 held 1.7 --------- 10.2/31.8 161    8/25 2.5 mg 1.4 --------- 9.8/30.8 172    8/26 4 mg 1.3  10.3/32.9 191      Assessment:  · Patient is a 80year old male with history of mechanical MVR and atrial fibrillation on warfarin  · Had tooth extraction on 8/15; INR jumped to 4.3 on 8/18 and bleeding from gums was persistent  · Admitted on 8/19 with shortness of breath and weakness, diagnosed with aspiration pneumonia  · Patient followed at [address] [place] [address] - last documented regimen is warfarin 4 mg on Friday and warfarin 3 mg every other day of the week  · Patient's warfarin was held 8/22-8/24 d/t low hgb, positive occult stools, and need for possible endoscopy. Per Dr. Chris Vazquez, no need for endoscopy.   · INR goal = 2.5-3.5; INR today = 1.3    Plan:  · Warfarin 4 mg today  · Daily PT/INR until the INR is stable within the therapeutic range  · Pharmacist will follow and monitor/adjust dosing as necessary    Alto Estimable, PharmD 8/26/2019 8:45 AM

## 2019-08-26 NOTE — PROGRESS NOTES
Occupational Therapy  OT BEDSIDE TREATMENT NOTE      Date:2019  Patient Name: Bijal [place]  MRN: [id number]  : 1927  Room: [address] [place] [address]     Evaluating OT: Julia Gregorio. KAYLIE PottsR/L - [id number]     AM-PAC Daily Activity Raw Score: 18     Recommended Adaptive Equipment: continue to assess      Diagnosis: Pneumonia [J18.9]     Pertinent Medical History: cancer, HTN, OA     Precautions: falls     Home Abbe Kim is a resident of the [facility] at [address] [place] [address]. Patient has a walk-in shower (with seat). Equipment Owned: rollator     Prior Level of Function (PLOF): Per patient, patient was independent with most ADLs, needed assistance with LB dressing tasks, needed assistance with IADLs, and independent with functional mobility (with rollator) prior to this hospitalization.     Pain Level: Patient denied experiencing pain. Cognition: Patient alert and grossly oriented.      Functional Assessment:    Initial Eval Status  Date: 2019 Treatment Status Short Term Goals  Treatment Frequency: PRN   Feeding Setup wooten  Independent   Grooming Min A  wooten while seated. Supervision  (standing at sink)   UB Dressing SBA   Independent   LB Dressing Mod A Min A to don pants and slipper socks.   Min A - with use of AE, as needed/appropriate   Bathing Mod A   SBA - with use of AE/DME, as needed/appropriate   Toileting Min A Declined need for toileting Supervision   Bed Mobility  Supine-to-Sit: SBA  Supervision supine to sit  Independent   Functional Transfers Sit-to-Stand: Min A   from EOB SBA transfer from bed and chair  Independent   Functional Mobility Min A   (with walker) within patient's room SBA using w/w  Mod I - with use of device, as needed/appropriate   Balance Sitting: Good  (at EOB)  Standing: Fair-  (with walker)  Fair+ dynamic standing balance during completion of ADLs and other functional tasks.    Activity Tolerance Fair- Fair  Patient will demonstrate Good understanding and consistent implementation of

## 2019-08-26 NOTE — PROGRESS NOTES
Nutrition Assessment    Type and Reason for Visit: Reassess    Nutrition Recommendations: Continue current diet, Modify current ONS to Ensure once daily    Nutrition Assessment: Pt improving from a nutritional standpoint d/t increased PO intakes since admit. Will decrease ONS to once daily w/ noted hyperkalemia and wt trending up since admit. Malnutrition Assessment:  · Malnutrition Status: At risk for malnutrition  · Context: Chronic illness  · Findings of the 6 clinical characteristics of malnutrition (Minimum of 2 out of 6 clinical characteristics is required to make the diagnosis of moderate or severe Protein Calorie Malnutrition based on AND/ASPEN Guidelines):  1. Energy Intake-Greater than 75% of estimated energy requirement, Greater than or equal to 5 days    2. Weight Loss-5% loss or greater, in 1 month  3. Fat Loss-No significant subcutaneous fat loss,    4. Muscle Loss-No significant muscle mass loss,    5. Fluid Accumulation-No significant fluid accumulation,    6.  Strength-Not measured    Nutrition Risk Level:  Moderate    Nutrient Needs:  · Estimated Daily Total Kcal: 0208-5593(MSJ 1563 x 1.2 SF)  · Estimated Daily Protein (g): (1.2-1.4 gm/kg IBW)  · Estimated Daily Total Fluid (ml/day): 5747-4126(1 ml/kcal)    Nutrition Diagnosis:   · Problem: Inadequate oral intake  · Etiology: related to Insufficient energy/nutrient consumption(2/2 recent oral surgery w/ bleeding)     Signs and symptoms:  as evidenced by Diet history of poor intake    Objective Information:  · Nutrition-Focused Physical Findings: pt alert, active BS, soft abd, trace/+1 non-pitting edema, tremors, fluids WDL, hyperkalemia  · Wound Type: None  · Current Nutrition Therapies:  · Oral Diet Orders: Cardiac   · Oral Diet intake: %  · Oral Nutrition Supplement (ONS) Orders: Standard High Calorie Oral Supplement  · ONS intake: 51-75%  · Anthropometric Measures:  · Ht: 5' 10\" (177.8 cm)   · Current Body Wt: 213 lb (96.6 kg)(8/25 bed scale)  · Admission Body Wt: 197 lb (89.4 kg)(bed 8/20)  · Usual Body Wt: 215 lb (97.5 kg)(act 8/1/19 per EMR, pt confirms)  · % Weight Change: CBW elevated since admit, however fluids WDL at this time  · Ideal Body Wt: 166 lb (75.3 kg), % Ideal Body 118%(using admit)  · BMI Classification: BMI 25.0 - 29.9 Overweight(using admit)    Nutrition Interventions:   Continue current diet, Modify current ONS(decrease Ensure to once daily)  Continued Inpatient Monitoring, Education Not Indicated, Coordination of Care    Nutrition Evaluation:   · Evaluation: Progressing toward goals   · Goals: PO Intake >75% of meals/ONS.       · Monitoring: Meal Intake, Supplement Intake, Diet Tolerance, Skin Integrity, I&O, Weight, Pertinent Labs, Monitor Bowel Function      Electronically signed by Sheryl Floyd, MS, RD, LD on 8/26/19 at 4:29 PM    Contact Number: [phone]

## 2019-08-27 NOTE — PROGRESS NOTES
Pharmacy Consultation Note  (Anticoagulant Dosing and Monitoring)    Initial consult date: 8/19/2019  Consulting physician: Dr. Jackelyn Thompson    Allergies:  Patient has no known allergies. Ht Readings from Last 1 Encounters:   08/19/19 5' 10\" (1.778 m)     Wt Readings from Last 1 Encounters:   08/27/19 213 lb 3.2 oz (96.7 kg)     Warfarin Indication Target   INR [place] Dose  (if applicable) Diet/Feeding Tube   Mechanical MVR/Atrial fibrillation 2.5-3.5 Warfarin 4 mg on Friday; Warfarin 3 mg all other days of week (per Anticoagulation Clinic note 8/1) Cardiac diet  ENSURE BID     Vitamin K or Blood product  Administration Date     2 units PRBC 8/23         Warfarin drug-drug interactions  Start  Stop Home Med?  Comments                                 TSH:  No results found for: TSH     Hepatic Function Panel:                          Lab Results   Component Value Date    ALKPHOS 54 08/19/2019    ALT 16 08/19/2019    AST 28 08/19/2019    PROT 6.4 08/19/2019    BILITOT 0.9 08/19/2019    BILIDIR 0.2 05/03/2011    LABALBU 3.7 08/19/2019    LABALBU 4.0 05/03/2011       Date Warfarin Dose INR Heparin or LMWH HBG/  HCT PLT Comment   8/19 No dose 5.7 --------- 10.9/32.9 121    8/20 No dose 5.2 --------- 10.6/32.6 134    8/21 2 mg 3.5 --------- 9.2/28.9 119    8/22 held 3 ------------ ------ ----    8/23 held   2.7 --------- 8.1/24.7 145    8/24 held 1.7 --------- 10.2/31.8 161    8/25 2.5 mg 1.4 --------- 9.8/30.8 172    8/26 4 mg 1.3  10.3/32.9 191    8/27 3 mg 1.6 ------ 9.2/28.8 166      Assessment:  · Patient is a 80year old male with history of mechanical MVR and atrial fibrillation on warfarin  · Had tooth extraction on 8/15; INR jumped to 4.3 on 8/18 and bleeding from gums was persistent  · Admitted on 8/19 with shortness of breath and weakness, diagnosed with aspiration pneumonia  · Patient followed at [address] [place] [address] - last documented regimen is warfarin 4 mg on Friday and warfarin 3 mg every other day of the week  · Patient's warfarin was held 8/22-8/24 d/t low hgb, positive occult stools, and need for possible endoscopy. Per Dr. Chris Vazquez, no need for endoscopy.   · INR goal = 2.5-3.5; INR today = 1.6    Plan:  · Warfarin 3 mg today, ordered 3 mg daily, discharge possible today, will need close f/u with INRs  · Daily PT/INR until the INR is stable within the therapeutic range  · Pharmacist will follow and monitor/adjust dosing as necessary    Alto Adam Ventura 8/27/2019 8:39 AM

## 2019-08-27 NOTE — CARE COORDINATION
Met with patient and his daughter to discuss discharge plan of care. A nebulizer is in order. Family states there is a nebulizer but is [employer] years old that was the pt wife. Pt nor daughter has a preference for DME company,  [employer] [place] ever is covered by insurance. \"  [employer]iggy Lang is setting up nebulizer DME.

## 2019-08-28 NOTE — CARE COORDINATION
[facility] Transitions Initial Follow Up Call    Call within 2 business days of discharge: Yes    Patient: Isidra Bhandari Patient : 1927   MRN: [id number]  Reason for Admission: Pneumonia  Discharge Date: 19 RARS: Readmission Risk Score: 22      Last Discharge [address] [address]       Complaint Diagnosis Description Type Department Provider    19 Shortness of Breath Congestive heart failure, unspecified HF chronicity, unspecified heart failure type ([address] . .. ED to Hosp-Admission (Discharged) (ADMITTED) [facility] Sherryll Closs, MD; Michael Adair. .. Spoke with: Vasyl Angel, nurse at [facility] [place] at [facility] [employer]: [address] [facility]     Non-face-to-face services provided:  Scheduled appointment with PCP-[facility] will arrange follow up appt with PCP  Obtained and reviewed discharge summary and/or continuity of care documents    Care Transitions 24 Hour Call    Schedule Follow Up Appointment with PCP:  Declined  Do you have any ongoing symptoms?:  No  Do you have a copy of your discharge instructions?:  Yes  Do you have all of your prescriptions and are they filled?:  Yes  Have you been contacted by a [facility] Pharmacist?:  No  Have you scheduled your follow up appointment?:  No  Were you discharged with any Home Care or Post Acute Services:  Yes  Post Acute Services: Other, Outpatient/Community Services (Comment: [facility] and [facility] staff )  Do you feel like you have everything you need to keep you well at home?:  Yes  Care Transitions Interventions  No Identified Needs       Spoke with Vasyl Angel, nurse at [facility] & [place] at [facility], for initial [address] care transition call post hospital discharge. Explained the role of Care Transition Coordinator and the BPCI-A program. CMS BPCI-A letter reviewed and will be mailed to the patient, she is agreeable to follow up post discharge from the hospital.     Vasyl Angel reports that Paul Saleem is doing \"good\" today. She denies any complaints of SOB or chest discomfort today. She reports that the nebulizer was delivered yesterday afternoon. She denies any bleeding from the gums. Med review completed. Gael Farfan 81Nora confirmed that Laura Hand daughter updated them that they would like [address] [place] to follow and they are working on arranging, Gael Gasca denies needing CTN assistance with this. CTN explained that a member of the Care Transition Central Team will be contacting them for further follow up calls, Gael Gasca is in agreement and denies any other needs or concerns at this time.      Follow Up  Future Appointments   Date Time Provider Gill Blunt   9/26/2019  1:30 PM [facility] Mando Zuniga RN

## 2019-08-29 NOTE — TELEPHONE ENCOUNTER
Palliative Medicine  Social Work Progress Note    Pt Name: Mandy Valverde    Referral received from inpatient palliative team. Chart reviewed and noted that pt's daughter is requesting [facility] to follow this pt. Placed call to daughter, Sheeba Parker, to clarify. Per daughter, [facility] [address] [facility]. Pt unable to get out to many appointments; scheduled for home visit 9/13/19 at 1pm with palliative NP and SW. Provided our contact information and encouraged daughter to call with any questions. She voiced understanding. Addendum:  Daughter called back and states she has an appointment conflict on 3/1244' would like to meet earlier. Pt rescheduled for 9am on 9/13/19.

## 2019-08-30 NOTE — PROGRESS NOTES
[address] [facility]    Patient Findings     Positives:   Missed doses (SEE [address] WARFARIN DOSING/INRS), Change in medications (PREDNISONE IS NOW 5 MG DAILY (PREVIOUSLY 8 MG DAILY)), Hospital admission ([address] 8/16-8/27 D/T EXCESSIVE GUM BLEEDING WHICH LED TO ASPIRATION PNEUMONIA)    Negatives:   Signs/symptoms of thrombosis, Signs/symptoms of bleeding, Laboratory test error suspected, Change in health, Change in alcohol use, Change in activity, Upcoming invasive procedure, Emergency department visit, Upcoming dental procedure, Extra doses, Change in diet/appetite, Bruising, Other complaints    Comments:   [facility] (CNP) THIS Thursday - CHECK HGB         Patient  reports that he has quit smoking. He has never used smokeless tobacco.     Assessment/Plan:  Warfarin indication: mechanical MVR and atrial fibrillation                INR today is therapeutic at 3.4, goal is 2.5-3.5. Warfarin Dose: PATIENT ONLY RESUMED WARFARIN ON 8/25 AFTER HOLDING FOR A WEEK DURING HOSPITAL STAY. HE RECENTLY HAD SOME ELEVATED INR'S. WILL DECREASE WEEKLY DOSE 18% FOR THE NEXT WEEK: 2 MG EVERY M/W/F; 3 MG ALL OTHER DAYS    Follow Up: 1 week    Patient understands dosing directions and information discussed. Dosing schedule and follow up appointment given to patient. Patient acknowledges working in consult agreement with pharmacist as referred by his/her physician.     Jorge Saini PharmD 8/30/2019 4:23 PM

## 2019-09-05 PROBLEM — Z95.2 HISTORY OF MITRAL VALVE REPLACEMENT WITH MECHANICAL VALVE: Status: RESOLVED | Noted: 2018-09-10 | Resolved: 2019-01-01

## 2019-09-13 NOTE — PROGRESS NOTES
Palliative Care Department  Palliative Care Initial Consult  Provider: Edu Soto APRN-CNP    Referring Provider:  Dr. Opal hCavez PM team    Location of Service:   PCP:  Dr. Lolly Estrada  Cardiology:  Dr. Hayden Schulz    Reason for Consult:  []  Code status Discussion  [x]  Assist with goals of care  [x]  Psychosocial support  [x]  Symptom Management  []  Advanced Care Planning     Chief Complaint: Eli Mera is a 80 y.o. male with chief complaint of none. 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 They were encouraged to call with any questions, concerns, needs, or changes in symptoms. Subjective:     HPI:  Eli Mera is a 80 y.o. male with significant past medical history of childhood rheumatic fever, atrial fibrillation, mitral valve stenosis status post mitral valve replacement, aortic stenosis status post TAVR, atrial fibrillation on chronic anticoagulation, remote history of hemorrhagic CVA, and chronic pain due to osteoarthritis. He was referred to [address] [place] [address] following his recent hospitalization for outpatient palliative medicine follow up. Subjective/Events/Discussions:  9/13/19:  Mr. Tiffany Palm is seen today in his residence with his daughter present.   He is alert,

## 2019-09-13 NOTE — PROGRESS NOTES
current treatment. Daughter requested for this SW to meet with pt independent of this visit to offer pt 1:1 supportive counseling. Pt agreeable to this. Reviewed barriers to care and none were identified. Provided active listening and support as pt, daughter shared some of pt's life review. Provided pt with [facility] contact information and encouraged pt or daughter to reach out with any additional needs, changes in pt's condition or barriers to care. This SW will plan to visit with pt in approximately one month to offer ongoing supportive presence, counseling. Plan:   1.  Ongoing support to pt and family

## 2020-01-01 ENCOUNTER — HOSPITAL ENCOUNTER (OUTPATIENT)
Dept: NON INVASIVE DIAGNOSTICS | Age: 85
Discharge: HOME | End: 2020-01-16
Payer: MEDICARE

## 2020-01-01 ENCOUNTER — TELEPHONE (OUTPATIENT)
Dept: PALLATIVE CARE | Age: 85
End: 2020-01-01

## 2020-01-01 VITALS
WEIGHT: 205 LBS | TEMPERATURE: 97.6 F | DIASTOLIC BLOOD PRESSURE: 79 MMHG | SYSTOLIC BLOOD PRESSURE: 174 MMHG | HEART RATE: 73 BPM | HEIGHT: 70 IN | BODY MASS INDEX: 29.35 KG/M2

## 2020-01-01 LAB
LV EF: 65 %
LVEF MODALITY: NORMAL

## 2020-01-01 PROCEDURE — 99211 OFF/OP EST MAY X REQ PHY/QHP: CPT

## 2020-01-01 PROCEDURE — 93306 TTE W/DOPPLER COMPLETE: CPT

## 2020-01-01 PROCEDURE — 99213 OFFICE O/P EST LOW 20 MIN: CPT | Performed by: PHYSICIAN ASSISTANT

## 2020-01-16 NOTE — PROGRESS NOTES
[facility] CLINICS Valve Clinic  Visit Note      Patient name: Elliott Cuevas    Reason for visit: TAVR follow up    Referring Physician: Julius Lee MD    Primary Care Physician: Zayda Hay DO    Date of service: 1/16/2020      Chief Complaint: TAVR follow up     HPI: Mr. Tiff Emmanuel presents for follow up s/p TAVR on 9/27/17. He continues to do very well. He is able to perform all desired activities without dyspnea. He denies chest pain, orthopnea, PND, palpitations or syncope. He continues with mild LE edema, but has improved with increased diuretic dose a few weeks ago. Allergies: No Known Allergies    Home medications:    Current Outpatient Medications   Medication Sig Dispense Refill    olmesartan (BENICAR) 40 MG tablet Take 40 mg by mouth daily      predniSONE (DELTASONE) 5 MG tablet Take 5 mg by mouth daily      ipratropium-albuterol (DUONEB) 0.5-2.5 (3) MG/3ML SOLN nebulizer solution Inhale 3 mLs into the lungs every 4 hours (Patient taking differently: Inhale 3 mLs into the lungs every 4 hours as needed ) 360 mL 3    Respiratory Therapy Supplies (FULL KIT NEBULIZER SET) MISC Use as directed with nebulized medication. 1 each 0    oxyCODONE-acetaminophen (PERCOCET)  MG per tablet Take 1 tablet by mouth every 6 hours as needed.  torsemide (DEMADEX) 20 MG tablet Take 20 mg by mouth daily      warfarin (COUMADIN) 3 MG tablet Take 3 mg by mouth daily Take as directed by Kasie [address]. See encounters for most recent dosing history. MENDEZ COUMADIN      amLODIPine (NORVASC) 10 MG tablet Take 10 mg by mouth daily      metoprolol succinate (TOPROL XL) 25 MG extended release tablet Take 25 mg by mouth 2 times daily Take morning of surgery with a sip of water      Multiple Vitamins-Minerals (CERTAGEN SILVER PO) Take  by mouth daily.        Current Facility-Administered Medications   Medication Dose Route Frequency Provider Last Rate Last Dose    perflutren lipid meetings of clubs or organizations: Not on file     Relationship status: Not on file    Intimate partner violence:     Fear of current or ex partner: Not on file     Emotionally abused: Not on file     Physically abused: Not on file     Forced sexual activity: Not on file   Other Topics Concern    Not on file   Social History Narrative    Not on file       Family History:  No family history on file. Review of Systems:  Constitutional: Denies fevers, chills, or weight loss. HEENT: Denies visual changes or hearing loss. Heart: As per HPI. Lungs: Denies shortness of breath, cough, or wheezing. Gastrointestinal: Denies nausea, vomiting, constipation, or diarrhea. Genitourinary: dysuria or hematuria. Psychiatric: Patient denies anxiety or depression. Neurologic: Patient denies weakness of the extremities, dizziness, or headaches. All other ROS checked and found to be negative. Objective:  Vitals: /79  P 73  R 16  T 97.6  General Appearance: Pleasant 80y.o. year old male who appears stated age. Communicates well, no acute distress. HEENT: Head is normocephalic, atraumatic. EOMs intact, PERRL. Trachea midline. Lungs: Normal respiratory rate and normal effort. He is not in respiratory distress. Breath sounds clear to auscultation. No wheezes. Heart: Normal rate. Regular rhythm. S1 mechanical and S2 normal. I/VI systolic murmur. Chest: Symmetric chest wall expansion. Extremities: Normal range of motion. Trace-mild edema bilaterally. Neurological: Patient is alert and oriented to person, place and time. Patient has normal reflexes. Skin: Warm and dry. Abdomen: Abdomen is soft and non-distended. Bowel sounds are normal. There is no abdominal tenderness tenderness. There is no guarding. There is no mass. Pulses: Distal pulses are intact. Skin: Warm and dry without lesions. Assessment:   1. Severe AS s/p TAVR - doing well. Mean gradient 6 mmHg by echo today  2. Mechanical MVR  3. Permanent AFib - chronic warfarin managed at [facility] clinic  4. NYHA class I      Plan:   1. Follow up in valve clinic PRN  2. SBE prophylaxis  3. Follow up with Drs. Harlene Goltz as scheduled      Electronically signed by Chris Rizo PA-C on 1/16/2020 at 11:45 AM

## 2020-07-15 NOTE — TELEPHONE ENCOUNTER
Call from Elvin Devine stating her father's condition is declining. He is having increased SOB, a CXR was done and shows infiltrates. PCP Recommended for Dav Shall to go to ED. Daughter Elvin Devine called back and is requesting a referral for [facility] so patient does not need to go to ED. Called and referral made to [facility] in Intake dept. Called and [facility] [employer]. Instructed to give scheduled Norco and PRN dose of Alprazolam to help ease patient's SOB. Patient is Satting at 87% on 2 L/NC. Displaying dyspnea per Bogdan Solorzano, [facility] [employer].

## 2024-09-12 NOTE — CONSULTS
of Unasyn as well as 500 cc bolus of fluids. He was started on nasal cannula oxygen, and transferred to a general medical floor in stable condition. At the time of examination this evening, patient is resting comfortably up in chair. His son is at bedside. He is wearing 2 L nasal cannula oxygen. He denies any angina-like chest pain, shortness of breath, abdominal pain, nausea, or vomiting. He has had no further oral bleeding. He denies any changes in bowel habits, has had no rectal bleeding or melena. He does note ongoing, occasionally productive cough. Palliative medicine specific HPI:  The patient stated that his bleeding has now discontinued. He is not coughing up blood. He does have a mild cough and is being treated for possible aspiration secondary to the bleeding from his gums and tooth removal.  The patient stated that he \"feels great\". He is denying any headache or blurred vision, ear pain or sore throat, neck or back pain, chest pain or palpitations, shortness of breath, abdominal pain, nausea or vomiting, diarrhea, hematemesis, hematochezia, or melena. 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 He is hoping to get his INR back to 2.5-3.5 for his artificial valves. He is hoping to return back to his assisted living facility. He wants to remain a DNR-CC. He does admit to long-term swelling of his left lower extremity as well as some to his right lower extremity. He is denying any other symptoms at this time. He would like followed on the outpatient setting by palliative medicine. The patient's daughter is requesting [facility] palliative medicine to follow him on the outpatient side. Goals of care: live longer, improve or maintain function/quality of life and continue current management  Advance Directives: DNR-CCO  Surrogate:Child  Prognosis: unknown  Spiritual assessment: community-based clergy involved  Bereavement and grief: to be determined.     Past Medical Spray Paint Technique: No Show Spray Paint Technique Variable?: Yes Spray Paint Text: The liquid nitrogen was applied to the skin utilizing a spray paint frosting technique. Consent: The patient's consent was obtained including but not limited to risks of crusting, scabbing, blistering, scarring, darker or lighter pigmentary change, recurrence, incomplete removal and infection.  Post-Care Instructions: I reviewed with the patient in detail post-care instructions. Patient is to wear sunprotection, and avoid picking at any of the treated lesions. Pt may apply Vaseline to crusted or scabbing areas. Medical Necessity Clause: This procedure was medically necessary because the lesions that were treated were:  Detail Level: Detailed Medical Necessity Information: It is in your best interest to select a reason for this procedure from the list below. All of these items fulfill various CMS LCD requirements except the new and changing color options.